# Patient Record
Sex: FEMALE
[De-identification: names, ages, dates, MRNs, and addresses within clinical notes are randomized per-mention and may not be internally consistent; named-entity substitution may affect disease eponyms.]

---

## 2017-07-21 ENCOUNTER — HOSPITAL ENCOUNTER (EMERGENCY)
Dept: HOSPITAL 42 - ED | Age: 62
Discharge: HOME | End: 2017-07-21
Payer: MEDICAID

## 2017-07-21 VITALS — HEART RATE: 75 BPM

## 2017-07-21 VITALS
SYSTOLIC BLOOD PRESSURE: 140 MMHG | OXYGEN SATURATION: 99 % | DIASTOLIC BLOOD PRESSURE: 72 MMHG | RESPIRATION RATE: 16 BRPM

## 2017-07-21 VITALS — TEMPERATURE: 98.3 F

## 2017-07-21 VITALS — BODY MASS INDEX: 25 KG/M2

## 2017-07-21 DIAGNOSIS — E78.00: ICD-10-CM

## 2017-07-21 DIAGNOSIS — R56.9: Primary | ICD-10-CM

## 2017-07-21 LAB
ALBUMIN SERPL-MCNC: 3.8 G/DL (ref 3–4.8)
ALBUMIN/GLOB SERPL: 1.3 {RATIO} (ref 1.1–1.8)
ALT SERPL-CCNC: 16 U/L (ref 7–56)
AST SERPL-CCNC: 20 U/L (ref 15–39)
BASOPHILS # BLD AUTO: 0.03 K/MM3 (ref 0–2)
BASOPHILS NFR BLD: 0.4 % (ref 0–3)
BUN SERPL-MCNC: 12 MG/DL (ref 7–21)
CALCIUM SERPL-MCNC: 9 MG/DL (ref 8.4–10.5)
EOSINOPHIL # BLD: 0.2 10*3/UL (ref 0–0.7)
EOSINOPHIL NFR BLD: 3 % (ref 1.5–5)
ERYTHROCYTE [DISTWIDTH] IN BLOOD BY AUTOMATED COUNT: 12.7 % (ref 11.5–14.5)
GFR NON-AFRICAN AMERICAN: > 60
GRANULOCYTES # BLD: 5.66 10*3/UL (ref 1.4–6.5)
GRANULOCYTES NFR BLD: 74.5 % (ref 50–68)
HGB BLD-MCNC: 13.8 GM/DL (ref 12–16)
LYMPHOCYTES # BLD: 1.2 10*3/UL (ref 1.2–3.4)
LYMPHOCYTES NFR BLD AUTO: 15.4 % (ref 22–35)
MAGNESIUM SERPL-MCNC: 2.1 MG/DL (ref 1.7–2.2)
MCH RBC QN AUTO: 30.9 PG (ref 25–35)
MCHC RBC AUTO-ENTMCNC: 34.2 G/DL (ref 31–37)
MCV RBC AUTO: 90.4 FL (ref 80–105)
MONOCYTES # BLD AUTO: 0.5 10*3/UL (ref 0.1–0.6)
MONOCYTES NFR BLD: 6.7 % (ref 1–6)
PLATELET # BLD: 297 10^3/UL (ref 120–450)
PMV BLD AUTO: 8.8 FL (ref 7–11)
RBC # BLD AUTO: 4.47 10^6/UL (ref 3.5–6.1)
TROPONIN I SERPL-MCNC: < 0.01 NG/ML
WBC # BLD AUTO: 7.6 10^3/UL (ref 4.5–11)

## 2017-07-21 NOTE — CARD
--------------- APPROVED REPORT --------------





EKG Measurement

Heart [id number]

MT 164P18

[id number]

[id number]

[id number]



<Conclusion>

Normal sinus rhythm with sinus arrhythmia

Normal ECG

## 2017-07-21 NOTE — ED PDOC
Arrival/HPI





- General


Historian: Patient, Family





- General


Chief Complaint: Syncope


Time Seen by Provider: 07/21/17 16:04





- History of Present Illness


Narrative History of Present Illness (Text): 





07/21/17 16:55


Pt is a 62 year old female with past medical history significant for seizures 

and high cholesterol who presents with recent seizure activity 30 minutes prior 

to arrival. The patient is joined by her brother in the room for the interview. 

The patient is able to recall walking into a grocery store earlier today with 

feelings of severe headache and lethargy and then waking up surrounded by 

bystanders. Pt states she often feels these symptoms prior to her seizures. The 

patient's brother reports that eye witnesses reported seeing the patient 

collapse and visibly convulse. He is unsure of how long the convulsions lasted 

or whether or not the patient struck her head when she fell. Patient was 

brought to the ED emergency department via EMS. Pt reports her last seizure 

activity was 7/17/2017 and she is compliant with her current epileptic medical 

regiment of Zonisamide and Lacosamide. She denies new medications and illicit 

drug use. She denies headache, change in vision, fever, diarrhea, or shortness 

of breath.   (Reese Lockett)





Past Medical History





- Provider Review


Nursing Documentation Reviewed: Yes





- Infectious Disease


Hx of Infectious Diseases: None





- Tetanus Immunization


Tetanus Immunization: Unknown





- Neurological


Hx Seizures: Yes





- Hematological/Oncological


Hx Cancer: Yes (Skin CA removed from nose)





- Musculoskeletal/Rheumatological


Hx Back Pain: Yes (Sciatic nerve)





- Gastrointestinal


Hx Gall Bladder Disease: Yes


Hx Gastroesophageal Reflux: Yes





- Psychiatric


Hx Depression: No


Hx Emotional Abuse: No


Hx Physical Abuse: No


Hx Substance Use: No





- Surgical History


Hx Cholecystectomy: Yes





- Anesthesia


Hx Anesthesia Reactions: No


Hx Malignant Hyperthermia: No





- Suicidal Assessment


Feels Threatened In Home Enviroment: No





Family/Social History





- Physician Review


Nursing Documentation Reviewed: Yes


Family/Social History: No Known Family HX


Smoking Status: Never Smoked


Hx Alcohol Use: No


Hx Substance Use: No


Hx Substance Use Treatment: No





Allergies/Home Meds


Allergies/Adverse Reactions: 


Allergies





No Known Allergies Allergy (Verified 07/21/17 15:55)


 








Home Medications: 


 Home Meds











 Medication  Instructions  Recorded  Confirmed


 


Unobtainable  07/21/17 07/21/17














Review of Systems





- Physician Review


All systems were reviewed & negative as marked: Yes





- Review of Systems


Constitutional: absent: Fevers


Eyes: absent: Vision Changes


Respiratory: absent: SOB


Cardiovascular: absent: Chest Pain


Neurological: Headache.  absent: Focal Weakness





Physical Exam


Vital Signs Reviewed: Yes


Temperature: Afebrile


Blood Pressure: Normal


Pulse: Regular


Respiratory Rate: Normal


Appearance: Positive for: Well-Appearing


Pain Distress: None


Mental Status: Positive for: Alert and Oriented X 3


Finger Stick Blood Glucose: 130





- Systems Exam


Head: Present: Atraumatic, Normocephalic


Pupils: Present: PERRL


Extroacular Muscles: Present: EOMI


Mouth: Present: Moist Mucous Membranes


Neck: Present: Normal Range of Motion.  No: Meningeal Signs


Respiratory/Chest: Present: Clear to Auscultation, Good Air Exchange.  No: 

Respiratory Distress, Accessory Muscle Use, Wheezes


Cardiovascular: Present: Regular Rate and Rhythm, Normal S1, S2.  No: Murmurs


Abdomen: Present: Normal Bowel Sounds.  No: Tenderness, Distention, Peritoneal 

Signs


Upper Extremity: Present: Normal Inspection.  No: Cyanosis, Edema


Lower Extremity: Present: Normal Inspection.  No: Edema


Neurological: Present: GCS=15, CN II-XII Intact, Speech Normal, Motor Func 

Grossly Intact, Normal Sensory Function


Skin: Present: Warm, Dry, Normal Color.  No: Rashes


Psychiatric: Present: Alert, Oriented x 3, Normal Insight, Normal Concentration





Medical Decision Making





- Lab Interpretations


I have reviewed the lab results: Yes





- RAD Interpretation


: Radiologist





- EKG Interpretation


Interpreted by ED Physician: Yes


Type: 12 lead EKG


ED Course and Treatment: 





07/21/17 17:09


Impression:





Pt is a 62 year old female with past medical history significant for seizures 

who presents to the emergency department after have witnessed seizure activity 

30 minutes prior to arrival. 





Differential Diagnosis included but are not limited to:  





- Primary seizure 


- Syncope





Plan:





- Imaging: EKG, CT w/o contrast of Head


- Labs: CBC, CMP, Mg, Phos, Cardiac enzymes


- Discussed with patient she is not allowed to drive


- Discussed with patient to follow up with neurologist


- Reassess and disposition





Progress Notes:


07/21/17 17:19





07/21/17 21:04


- Pt refuses to stay and wait for primary care provider for further work up 


- Discussed with patient need to follow up with primary care provider and 

neurologist and patient is in understanding


- Advised patient she is to not drive, operate heavy machinery, or swim 





 (Reese Lockett)








07/21/17 18:05


Patient seen and examined with resident.


Came up with treatment and disposition plan with resident.





pt has a hx of seizures, had a witnessed seizure


currently in no distress with no focal neurological deficits on examination





PMD pages, awaiting callback


pt refuses to be admitted, states she wants to be dc'd home


ambulates with steady gait in no distress


instructed to f/u with her PMD and neurologist outpatient and to return to the 

ER for new or worsening symptoms  (Reynold Esposito)





- Lab Interpretations


Lab Results: 








 07/21/17 18:00 





 07/21/17 18:00 





 Lab Results





07/21/17 18:00: Sodium 139, Potassium 4.0, Chloride 106, Carbon Dioxide 24, 

Anion Gap 13, BUN 12, Creatinine 0.9, Est GFR (African Amer) > 60, Est GFR (Non-

Af Amer) > 60, Random Glucose 93, Calcium 9.0, Phosphorus 2.5, Magnesium 2.1, 

Total Bilirubin 0.3, AST 20, ALT 16, Alkaline Phosphatase 124, Lactate 

Dehydrogenase 562, Total Creatine Kinase 49, Troponin I < 0.01, Total Protein 

6.8, Albumin 3.8, Globulin 3.0, Albumin/Globulin Ratio 1.3


07/21/17 18:00: WBC 7.6, RBC 4.47, Hgb 13.8, Hct 40.4, MCV 90.4, MCH 30.9, MCHC 

34.2, RDW 12.7, Plt Count 297, MPV 8.8, Gran % 74.5 H, Lymph % (Auto) 15.4 L, 

Mono % (Auto) 6.7 H, Eos % (Auto) 3.0, Baso % (Auto) 0.4, Gran # 5.66, Lymph # 

1.2, Mono # 0.5, Eos # 0.2, Baso # 0.03


07/21/17 16:11: POC Glucose (mg/dL) 130 H











- RAD Interpretation


Narrative RAD Interpretations (Text): 





07/21/17 19:58


Findings: 


Brain: There are scattered foci of hypodensity within the cerebral white matter

, likely representing small vessel ischemic disease in a patient this age. The 

acuity of the white matter disease is indeterminate. The white-gray 

differentiation is preserved demonstrating no acute territorial type infarct. 

The white-gray differentiation is preserved demonstrating no acute territorial 

type infarct. There is mild prominence of the ventricles and sulci, compatible 

with atrophy. No acute intracranial hemorrhage is seen.   (Reese Lockett)


Radiology Orders: 








07/21/17 16:51


HEAD W/O CONTRAST [CT] Stat 














- EKG Interpretation


EKG Interpretation (Text): 





07/21/17 17:20


Rate of 73 bpm, NSR, No acute ST elevations  (Reese Lockett)





- PA / NP / Resident Statement


MD/DO has reviewed & agrees with the documentation as recorded.


MD/DO has examined the patient and agrees with the treatment plan.





Disposition/Present on Arrival





- Present on Arrival


Any Indicators Present on Arrival: No


History of DVT/PE: No


History of Uncontrolled Diabetes: No


Urinary Catheter: No


History of Decub. Ulcer: No


History Surgical Site Infection Following: None





- Disposition


Have Diagnosis and Disposition been Completed?: Yes


Disposition Time: 21:06


Patient Plan: Discharge





- Disposition


Diagnosis: 


 Seizure





Disposition: HOME/ ROUTINE


Condition: GOOD


Additional Instructions: 





Mrs. Mortensen, thank you for letting us take care of you today. Your provider was 

Dr. Lockett. You were treated for seizure. The emergency medical care you 

received today was directed at your acute symptoms. If you were prescribed any 

medication, please fill it and take as directed. It may take several days for 

your symptoms to resolve. Return to the Emergency Department if your symptoms 

worsen, do not improve, or if you have any other problems.





Please contact your doctor or call one of the physicians/clinics you have been 

referred to that are listed on the Patient Visit Information form that is 

included in your discharge packet. Bring any paperwork you were given at 

discharge with you along with any medications you are taking to your follow up 

visit. Our treatment cannot replace ongoing medical care by a primary care 

provider (PCP) outside of the emergency department.





Thank you for allowing the [facility] team to be part of your care today.





FOLLOW UP WITH PRIMARY CARE PROVIDER





FOLLOW UP WITH NEUROLOGIST


Referrals: 


Radha Abreu MD [Primary Care Provider] - Follow up with primary

## 2017-07-21 NOTE — CT
EXAM:

  CT Head Without Intravenous Contrast



CLINICAL HISTORY:

  The patient age is 62 years old and is female; Pain; Headache; Headache not 

specified; Additional info: Seizure activity Facility exam id and description: 

Ct heads head w/o contrast



TECHNIQUE:

  Axial computed tomography images of the head/brain without intravenous 

contrast.  This CT exam was performed using one or more of the following dose 

reduction techniques:  automated exposure control, adjustment of the mA and/or 

kV according to patient size, and/or use of iterative reconstruction technique.



EXAM DATE/TIME:

  7/21/2017 4:51 PM



COMPARISON:

  No relevant prior studies available.



FINDINGS:

  Brain:  There are scattered foci of hypodensity within the cerebral white 

matter, likely representing small vessel ischemic disease in a patient this 

age.  The acuity of the white matter disease is indeterminate.  The white-gray 

differentiation is preserved demonstrating no acute territorial type infarct.  

There is mild prominence of the ventricles and sulci, compatible with atrophy.  

No acute intracranial hemorrhage is seen.

  Midline shift:  There is no midline shift.

  Ventricles:  See above.

  Bones/joints:  The calvarium demonstrates no evidence for a depressed 

fracture.

  Soft tissues:  No acute abnormality.

  Vasculature:  There is mild atherosclerotic calcification of the cavernous 

internal carotid arteries.

  Sinuses:  Unremarkable as visualized.  No acute sinusitis.

  Mastoid air cells:  No mastoid effusion.



IMPRESSION:     

1.  No acute intracranial hemorrhage or acute territorial type infarct.

2.  There are scattered foci of hypodensity within the cerebral white matter, 

likely representing small vessel ischemic disease in a patient this age.  

3.  Mild atrophy.

## 2018-03-13 ENCOUNTER — HOSPITAL ENCOUNTER (INPATIENT)
Dept: HOSPITAL 42 - ED | Age: 63
LOS: 3 days | Discharge: HOME | DRG: 138 | End: 2018-03-16
Attending: HOSPITALIST
Payer: MEDICAID

## 2018-03-13 VITALS — BODY MASS INDEX: 25.7 KG/M2

## 2018-03-13 DIAGNOSIS — K20.9: ICD-10-CM

## 2018-03-13 DIAGNOSIS — K44.9: ICD-10-CM

## 2018-03-13 DIAGNOSIS — G40.919: ICD-10-CM

## 2018-03-13 DIAGNOSIS — I47.1: ICD-10-CM

## 2018-03-13 DIAGNOSIS — I08.3: ICD-10-CM

## 2018-03-13 DIAGNOSIS — K63.89: ICD-10-CM

## 2018-03-13 DIAGNOSIS — M79.661: ICD-10-CM

## 2018-03-13 DIAGNOSIS — G43.909: ICD-10-CM

## 2018-03-13 DIAGNOSIS — M54.16: ICD-10-CM

## 2018-03-13 DIAGNOSIS — M54.30: ICD-10-CM

## 2018-03-13 DIAGNOSIS — K64.8: ICD-10-CM

## 2018-03-13 DIAGNOSIS — I48.0: Primary | ICD-10-CM

## 2018-03-13 DIAGNOSIS — K29.70: ICD-10-CM

## 2018-03-13 DIAGNOSIS — F41.0: ICD-10-CM

## 2018-03-13 DIAGNOSIS — K21.9: ICD-10-CM

## 2018-03-13 LAB
ALBUMIN SERPL-MCNC: 4.3 G/DL (ref 3–4.8)
ALBUMIN/GLOB SERPL: 1.3 {RATIO} (ref 1.1–1.8)
ALT SERPL-CCNC: 31 U/L (ref 7–56)
AST SERPL-CCNC: 32 U/L (ref 14–36)
BASOPHILS # BLD AUTO: 0.03 K/MM3 (ref 0–2)
BASOPHILS NFR BLD: 0.4 % (ref 0–3)
BUN SERPL-MCNC: 15 MG/DL (ref 7–21)
CALCIUM SERPL-MCNC: 10 MG/DL (ref 8.4–10.5)
EOSINOPHIL # BLD: 0.3 10*3/UL (ref 0–0.7)
EOSINOPHIL NFR BLD: 3.6 % (ref 1.5–5)
ERYTHROCYTE [DISTWIDTH] IN BLOOD BY AUTOMATED COUNT: 13 % (ref 11.5–14.5)
GFR NON-AFRICAN AMERICAN: 56
GRANULOCYTES # BLD: 4.25 10*3/UL (ref 1.4–6.5)
GRANULOCYTES NFR BLD: 61.5 % (ref 50–68)
HGB BLD-MCNC: 15.5 G/DL (ref 12–16)
LYMPHOCYTES # BLD: 2 10*3/UL (ref 1.2–3.4)
LYMPHOCYTES NFR BLD AUTO: 28.7 % (ref 22–35)
MCH RBC QN AUTO: 30.3 PG (ref 25–35)
MCHC RBC AUTO-ENTMCNC: 33.5 G/DL (ref 31–37)
MCV RBC AUTO: 90.4 FL (ref 80–105)
MONOCYTES # BLD AUTO: 0.4 10*3/UL (ref 0.1–0.6)
MONOCYTES NFR BLD: 5.8 % (ref 1–6)
PLATELET # BLD: 299 10^3/UL (ref 120–450)
PMV BLD AUTO: 9.6 FL (ref 7–11)
RBC # BLD AUTO: 5.12 10^6/UL (ref 3.5–6.1)
TROPONIN I SERPL-MCNC: < 0.01 NG/ML
WBC # BLD AUTO: 6.9 10^3/UL (ref 4.5–11)

## 2018-03-13 NOTE — CARD
--------------- APPROVED REPORT --------------





EKG Measurement

Heart [id number]

IN 216P57

[id number]

[id number]

[id number]



<Conclusion>

Sinus bradycardia with sinus arrhythmia with 1st degree AV block

Nonspecific T wave abnormality

LAD

## 2018-03-13 NOTE — US
HISTORY:

Leg pain and swelling. Evaluate for DVT



PHYSICIAN(S):  Abelardo Trinidad MD.



TECHNIQUE:

Duplex sonography and color-flow Doppler with graded compression were 

used to evaluate the deep venous systems of both lower extremities. 



FINDINGS:

The visualized deep venous systems of both lower extremities are 

sonographically normal and compressible. Normal wave forms and 

augmentation are seen. There is no sonographic evidence for deep 

venous thrombosis in the visualized segments of both lower 

extremities.



IMPRESSION:

No sonographic evidence for deep venous thrombosis in the visualized 

segments of both lower extremities.

## 2018-03-13 NOTE — RAD
HISTORY:

A-Fib- hx of Chest pain  



COMPARISON:

08/18/2015 



FINDINGS:



LUNGS:

No active pulmonary disease.



PLEURA:

No significant pleural effusion identified, no pneumothorax apparent.



CARDIOVASCULAR:

Normal.



OSSEOUS STRUCTURES:

No significant abnormalities.



VISUALIZED UPPER ABDOMEN:

Normal.



OTHER FINDINGS:

None.



IMPRESSION:

No active disease.

## 2018-03-13 NOTE — CON
DATE:  2018



SERVICE:  Cardiology.



REASON FOR THE CONSULTATION:  AFib, new onset.



BRIEF CLINICAL HISTORY:  This is a 62-year-old female who came in for

endoscopy today, but while the patient being hooked up, he was found to be

in AFib, so the patient was sent to the ER.  Patient denies any chest pain,

shortness of breath, denies any palpitation.  Cardiology consult was called

for evaluation and further management.  Patient is currently in ER, bed 6.



PAST MEDICAL HISTORY:  Significant for gastroesophageal reflux, seizure

disorder, status post cardiac catheterization by Dr. Win at [facility] in , found to be normal.



PAST SURGICAL HISTORY:  Significant for neck surgery,  section,

cholecystectomy, appendectomy in the past also admission cardiac

catheterization 3 years ago by Dr. Win at [facility],

where usually patient goes periodically and _____ heart is good.



FAMILY HISTORY:  Father had a throat cancer.  Mother had heart disease and

renal disease.



SOCIAL HISTORY:  Denies smoking.  Denies any history of alcohol abuse.  No

history of illicit drug abuse.  Lives with the .



CURRENT MEDICATIONS:  Patient is taking at home Zonegran 200 mg p.o.,

Topamax for seizure, Protonix and multivitamin.



REVIEW OF SYSTEMS:  As per HPI.



PHYSICAL EXAMINATION:  As follows:

VITAL SIGNS:  Temperature afebrile, heart rate 86, blood pressure 105/51.

HEENT:  PERRLA.  Intact.

NECK:  Supple.  No carotid bruit or thyromegaly.

CHEST:  Clear to auscultation.

HEART:  S1, S2, regular.

ABDOMEN:  Soft.

EXTREMITIES:  Clubbing and cyanosis negative.



LABORATORY DATA:  Blood workup:  WBC 6.9, hemoglobin 15.5, hematocrit 46.3,

platelet count 299.  Chemistry shows sodium 144, potassium 4.5, chloride

110, carbon dioxide 22, anion gap of 15, BUN 16, creatinine 1.  Troponin

0.01 negative.



Previous cardiac workup as follows:  Patient had a cardiac catheterization

3 years ago at [facility] by Dr. Win as told, essentially

normal coronary.  Patient in the most recent echo, here at [facility] that was

2015, almost 2-1/2 years ago that showed ejection fraction 55%,

mild-to-moderate aortic regurgitation, trace mitral regurgitation,

trace-to-mild tricuspid regurgitation, RV systolic pressure 29, trivial PE

dated 2015.  EKG shows initial admission, AFib, rate of 125,

nonspecific ST-T changes.  Patient was given 10 mg of Cardizem and patient

converted to normal sinus.  Post spontaneous cardioversion after Cardizem

shows normal sinus, sinus merced at rate of 56, poor RR progression, left

axis deviation.  No acute ST-T changes noted.



IMPRESSION:  Paroxysmal atrial fibrillation, history of seizure disorder,

history of cardiac catheterization 3 years ago at [facility] that was negative, previous echo 3 years ago normal, mild mitral

regurgitation, mild tricuspid regurgitation, mild aortic regurgitation, who

came in for gastroesophageal reflux and gastrointestinal workup, found to

be in atrial fibrillation, converted to normal sinus after 10 mg of IV

Cardizem.



RECOMMENDATIONS:  We will admit, give a dose of Lovenox.  Though patient

has a low JEANNE score, long-term anticoagulation, probably patient may not

need.  We will start Cardizem 30 mg q.8 hours.  We will get echo to assess

LV function, lipid profile, TSH, hemoglobin A1c and follow up EKG and echo.



Thank you, Dr. Hurley, for providing us the opportunity in taking care of

the patient, Serina Mortensen.  Further recommendations will be made depending

on the hospital course.





__________________________________________

Yolis Smiley MD





DD:  2018 12:50:10

DT:  2018 14:16:50

Job # [id number]

## 2018-03-13 NOTE — ED PDOC
Arrival/HPI





- General


Historian: Patient





- History of Present Illness


Time/Duration: Prior to Arrival, > week


Symptom Onset: Sudden


Symptom Course: Resolved


Quality: Tightness


Severity Level: 1


Activities at Onset: Rest


Context: Sitting





<Mario Serrano - Last Filed: 03/13/18 13:59>





<Yvon Wilson - Last Filed: 03/13/18 18:16>





- General


Chief Complaint: Palpitations


Time Seen by Provider: 03/13/18 09:15





- History of Present Illness


Narrative History of Present Illness (Text): 





03/13/18 09:24


62F w/ hx of seizures presents to ED w/ elevated heart rate in 140s from same 

day surgery. Patient was initially scheduled to have a colonoscopy and EGD for 

heart burn for many years. In the ED patient denies current symptoms of chest 

pain, heart palpitations, nausea, vomiting diarrhea. One week ago, patient had 

left sided chest pain described as chest tightness radiated to the left arm. 

Pain subsided after 2-3 days. Admits to being non-compliant with taking 

Aspirin. 


Of note: sees cardiologist and is scheduled to have a stress test done in April of 2018. 





PMH: Seziures


ALL: NKDA


SocialHx: denies Tobacco, etoh, recreational drug use. Live at home with 

, who she takes care of full time. States she needs to get back home to care 

for her .  stays active and walks 15 blocks per day





03/13/18 09:30


 (MerchantMario)





Past Medical History





- Provider Review


Nursing Documentation Reviewed: Yes





- Travel History


Have you recently traveled outside [place] w/in the past 3 mons?: No





- Past History


Past History: Non-Contributing





- Infectious Disease


Hx of Infectious Diseases: None





- Tetanus Immunization


Tetanus Immunization: Unknown





- Cardiac


Hx Heart Murmur: Yes





- Pulmonary


Hx Respiratory Disorders: No





- Neurological


Hx Seizures: Yes





- HEENT


Hx HEENT Disorder: No





- Renal


Hx Renal Disorder: No





- Endocrine/Metabolic


Hx Endocrine Disorders: No





- Hematological/Oncological


Hx Blood Disorders: No





- Integumentary


Hx Dermatological Disorder: No





- Musculoskeletal/Rheumatological


Hx Musculoskeletal Disorders: No





- Gastrointestinal


Hx Gall Bladder Disease: Yes


Hx Gastroesophageal Reflux: Yes





- Genitourinary/Gynecological


Hx Genitourinary Disorders: No





- Psychiatric


Hx Panic Disorder: Yes


Hx Substance Use: No





- Surgical History


Hx Cholecystectomy: Yes





- Anesthesia


Hx Anesthesia Reactions: No


Hx Malignant Hyperthermia: No





- Suicidal Assessment


Feels Threatened In Home Enviroment: No





<MilagrosMario - Last Filed: 03/13/18 13:59>





Family/Social History





- Physician Review


Nursing Documentation Reviewed: Yes


Family/Social History: Other (non-contributory)


Smoking Status: Never Smoked


Hx Alcohol Use: No


Hx Substance Use: No


Hx Substance Use Treatment: No





<MerchantMario - Last Filed: 03/13/18 13:59>





Allergies/Home Meds





<YeseniaadMario - Last Filed: 03/13/18 13:59>





<Yvon Wilson - Last Filed: 03/13/18 18:16>


Allergies/Adverse Reactions: 


Allergies





No Known Allergies Allergy (Verified 03/13/18 11:58)


 








Home Medications: 


 Home Meds











 Medication  Instructions  Recorded  Confirmed


 


Topiramate [Topamax] 50 mg PO BID 03/08/18 03/13/18


 


Zonisamide [Zonegran] 200 mg PO AMHS 03/08/18 03/13/18


 


Clobazam [Onfi] 10 mg PO BID 03/13/18 03/13/18


 


Lacosamide [Vimpat] 100 mg PO HS 03/13/18 03/13/18


 


Multivitamin [Daily Thanh] 1 tab PO DAILY 03/13/18 03/13/18


 


Pantoprazole Sodium [Protonix] 40 mg PO DAILY 03/13/18 03/13/18














Review of Systems





- Physician Review


All systems were reviewed & negative as marked: Yes





- Review of Systems


Constitutional: absent: Fatigue, Weight Change, Fevers


Eyes: absent: Vision Changes


ENT: absent: Hearing Changes, Tinnitus, Epistaxis


Respiratory: absent: SOB, Cough, Sputum


Cardiovascular: absent: Chest Pain, Palpitations, Edema, Calf Pain


Gastrointestinal: absent: Abdominal Pain, Stool Changes, Constipation, Nausea, 

Vomiting


Musculoskeletal: absent: Arthralgias, Back Pain


Skin: absent: Rash, Pruritis


Neurological: absent: Headache, Dizziness, Focal Weakness


Endocrine: absent: Diaphoresis


Hemo/Lymphatic: absent: Adenopathy


Psychiatric: absent: Anxiety





<MerchantMario - Last Filed: 03/13/18 13:59>





Physical Exam


Vital Signs Reviewed: Yes


Temperature: Afebrile


Blood Pressure: Normal


Pulse: Tachycardic


Respiratory Rate: Normal


Appearance: Positive for: Well-Appearing, Non-Toxic, Comfortable


Pain Distress: None


Mental Status: Positive for: Alert and Oriented X 3





- Systems Exam


Head: Present: Atraumatic, Normocephalic


Pupils: Present: PERRL


Extroacular Muscles: Present: EOMI


Conjunctiva: Present: Normal


Mouth: Present: Moist Mucous Membranes


Neck: Present: Normal Range of Motion.  No: JVD, Lymphadenopathy, Bruit


Respiratory/Chest: Present: Clear to Auscultation, Good Air Exchange.  No: 

Respiratory Distress, Accessory Muscle Use


Cardiovascular: Present: Normal S1, S2, Tachycardic.  No: Rub, Gallop


Abdomen: No: Tenderness, Distention, Normal Bowel Sounds


Upper Extremity: Present: Normal Inspection, NORMAL PULSES.  No: Edema


Lower Extremity: Present: Normal Inspection.  No: Edema, CALF TENDERNESS


Neurological: Present: GCS=15, Speech Normal


Skin: Present: Warm, Normal Color


Psychiatric: Present: Alert, Oriented x 3





<Mario Serrano - Last Filed: 03/13/18 13:59>


Vital Signs











  Temp Pulse Pulse Resp BP Pulse Ox


 


 03/13/18 16:00   62   18  145/61  98


 


 03/13/18 14:17   68    118/66 


 


 03/13/18 14:00   62   18  118/66  97


 


 03/13/18 11:46   62   16  105/71  97


 


 03/13/18 10:19  97.8 F     


 


 03/13/18 09:21   144 H    142/79 


 


 03/13/18 09:13   144 H   18  142/79  100


 


 03/13/18 09:04    55 L   














Medical Decision Making





- Lab Interpretations


I have reviewed the lab results: Yes


Interpretation: All labs normal





- EKG Interpretation


Interpreted by ED Physician: Yes


Type: 12 lead EKG


Comparison: Different from prev. EKG





<Mario Serrano - Last Filed: 03/13/18 13:59>





<Yvon Wilson - Last Filed: 03/13/18 18:16>


ED Course and Treatment: 





03/13/18 09:32


Aspirin


Cardizem for tachycardia- Rate control


Cardiac enzymes


CBC/CMP/Mag/Phos


CXR/EKG





03/13/18 11:53


Discussed case w/ Dr. Smiley in detail. Will start on 30 Cardizem PO q6.  (

Mario Serrano)


A 62 year old female sent in for atrial fibrillation. In agreement with 

resident note, which includes further HPI details. Patient was seen and 

evaluated with resident, came up with plan and treatment together. Patient with 

new onset a-fib, rule out ACS.





03/13/18 08:14


EKG prior to arrival showed atrial fibrillation at 125 BPM. Interpreted by me.





Cardizem 10 ml IV given for heart rate in the 130's which corrected rate to 70





03/13/18 09:00


EKG shows sinus bradycardia at 56 BPM with 1st degree AV block. Interpreted by 

me.





03/13/18 11:14


Case discussed with Dr. Ha who will accept the patient to her service. Dr. Smiley was consulted and recommended Cardizem 30mg PO q6h. He will order the 

Lovenox if needed. Aspirin 81mg is good for now. He will order an echo. Patient 

admitted to Telemetry for New Onset Afib.  (Yvon Wilson)





- Lab Interpretations


Narrative Lab Interpretation (Text): 





03/13/18 11:54


Reviewed all labs. WNL. Trops negative (Mario)


Lab Results: 








 03/13/18 09:30 





 03/13/18 09:30 





 Lab Results





03/13/18 09:30: Sodium 144, Potassium 4.5, Chloride 110 H, Carbon Dioxide 22, 

Anion Gap 16, BUN 15, Creatinine 1.0, Est GFR (African Amer) > 60, Est GFR (Non-

Af Amer) 56, Random Glucose 93, Calcium 10.0, Phosphorus 2.5, Magnesium 2.2, 

Total Bilirubin 0.6, AST 32, ALT 31, Alkaline Phosphatase 111, Lactate 

Dehydrogenase 637, Total Creatine Kinase 146, Troponin I < 0.01, Total Protein 

7.6, Albumin 4.3, Globulin 3.3, Albumin/Globulin Ratio 1.3


03/13/18 09:30: WBC 6.9, RBC 5.12, Hgb 15.5, Hct 46.3, MCV 90.4, MCH 30.3, MCHC 

33.5, RDW 13.0, Plt Count 299, MPV 9.6, Gran % 61.5, Lymph % (Auto) 28.7, Mono 

% (Auto) 5.8, Eos % (Auto) 3.6, Baso % (Auto) 0.4, Gran # 4.25, Lymph # (Auto) 

2.0, Mono # (Auto) 0.4, Eos # (Auto) 0.3, Baso # (Auto) 0.03











- RAD Interpretation


Radiology Orders: 








03/13/18 10:09


CXR [CHEST PORTABLE] [RAD] Stat 














- EKG Interpretation


EKG Interpretation (Text): 





03/13/18 09:34


Sinus merced w/ 1st Degree AV block


 (Merchant,Mario)





- Medication Orders


Current Medication Orders: 








Acetaminophen (Tylenol 325mg Tab)  650 mg PO Q6H PRN


   PRN Reason: Fever >100.4 F


Diltiazem HCl (Cardizem)  30 mg PO Q8 HARSHIL


   Last Admin: 03/13/18 14:17  Dose: 30 mg





MAR Pulse and Blood Pressure


 [place]     03[place]13/18 14:17  [facility]  [place]: 03/13/18 14:17  [facility]  [id number]


     Pulse


      Pulse Rate (60-90)                         68


     Blood Pressure


      Blood Pressure (100//90)             118/66





Lorazepam (Ativan)  1 mg IVP Q2H PRN; Protocol


   PRN Reason: Seizure activity


Multivitamins (Thera Tab)  1 tab PO DAILY HARSHIL


Non-Formulary Medication (Clobazam [Onfi])  10 mg PO BID HARSHIL


Non-Formulary Medication (Lacosamide [Vimpat])  100 mg PO HS HARSHIL


Pantoprazole Sodium (Protonix Ec Tab)  40 mg PO DAILY HARSHIL


Topiramate (Topamax)  50 mg PO BID HARSHIL


   PRN Reason: Protocol


Zonisamide (Zonegran)  200 mg PO AMHS HARSHIL





Discontinued Medications





Aspirin (Aspirin Chewable)  81 mg PO STAT STA


   Stop: 03/13/18 09:26


   Last Admin: 03/13/18 09:53  Dose: 81 mg





Diltiazem HCl (Cardizem)  20 mg IVP STAT STA


   Stop: 03/13/18 09:16


   Last Admin: 03/13/18 09:21  Dose: 20 mg





IVP Administration


 Document     03/13/18 09:21  [facility]  [place]Rec: 03/13/18 09:22  [facility]  [id number][place]


     Charges for Administration


      # of IVP Administrations                   1


MAR Pulse and Blood Pressure


 Document     03/13/18 09:21  [facility]  (Rec: 03/13/18 09:22  [facility]  [id number])


     Pulse


      Pulse Rate (60-90)                         144


     Blood Pressure


      Blood Pressure (100//90)             142/79





Non-Formulary Medication (Multivitamin [Daily Thanh])  1 tab PO DAILY HARSHIL











- PA / NP / Resident Statement


MD/DO has reviewed & agrees with the documentation as recorded.


MD/DO has examined the patient and agrees with the treatment plan.





<Mario Serrano - Last Filed: 03/13/18 13:59>





Disposition/Present on Arrival





- Present on Arrival


Any Indicators Present on Arrival: No


History of DVT/PE: No


History of Uncontrolled Diabetes: No


Urinary Catheter: No


History of Decub. Ulcer: No


History Surgical Site Infection Following: None





- Disposition


Have Diagnosis and Disposition been Completed?: Yes


Disposition Time: 12:26


Patient Plan: Admission





<Mario Serrano - Last Filed: 03/13/18 13:59>





- Disposition


Disposition Time: 11:14





<Yvon Wilson - Last Filed: 03/13/18 18:16>





- Disposition


Diagnosis: 


 Atrial fibrillation with RVR





Disposition: HOSPITALIZED


Condition: STABLE

## 2018-03-13 NOTE — CP.PCM.HP
<Naomie Trevizo - Last Filed: 18 14:08>





History of Present Illness





- History of Present Illness


History of Present Illness: 





CC: I was sent here from Endo suite





HPI: 62F with PMHx of seizure disorder presented to emergency department from 

the endoscopy suite for Afib with RVR. Patient states she was scheduled for a 

colonoscopy and EGD today when she was found to be in atrial fibrillation with 

RVR on the monitor. She denied any acute complaints of chest pain, palpitations

, lightheadedness, SOB at the time. Patient does not have a history of Afib or 

any cardiac history. She reports she had a high temp of 102F the day earlier 

with associated chills and diaphoresis for which she took ASA and it resolved. 

Patient also reports a single bloody BM that occurred 2 days ago. Stool was 

loose with bright red blood and maroon stool. Her last colonoscopy was 2 years 

ago and she has a hx of colon polyps and hemorrhoids as well as severe 

symptomatic GERD. No personal hx of colon cancer or esophageal cancer. Patient 

also disclosed a history of frequent seizures and takes several seizure 

medications. She was enrolled in a blinded drug trial where she took an 

unlabled seizure medication for 6 weeks. She stopped this medication 3 weeks 

ago because it did not give her any symptomatic relief. She has on average 2 

seizures per week. Her seizures occur during sleep and are usually witnessed by 

her . She takes 325 mg of aspirin following seizures and states she is 

compliant with her seizure medications. Patient denied recent travel or 

immobilization. No sick contacts. She has been eating and drinking normally. 

With the exception of the above and her recent bowel prep before colonoscopy, 

she has normal daily bowel movements. She got a flu shot this year.





ROS: Denies any chest pain, dyspnea, palpitations, headache, vision changes, 

abdominal pain, nausea, vomiting, new swelling, weight loss, fatigue.


 


PMHx: seizures (dx at age 19), GERD, migraine headaches, prior hx of panic 

attacks, heart murmur when she was a child 


PSurgHx:  Neck surgery (unclear which kind), , cholecystectomy, 

appendectomy


Meds: pls see chart 


ALL: NKDA


SocHx: denies Tobacco, etoh, recreational drug use. 2 liters of coca cola per 

day (no other caffeine intake). Live at home with  (86 yo), who she 

takes care of full time. States she needs to get back home to care for her 

.  stays active and walks 15 blocks per day


FamHx: Father with throat CA. Mother with heart disease,  from chronic 

kidney disease. Brothers, sisters, nephews all have heart disease (unspecified)

. 





PMD: Dr. Abreu - last seen 1-2weeks ago


GI: Dr. Swain


Cardiology: None


Neurologist: Dr. Avila





Present on Admission





- Present on Admission


Any Indicators Present on Admission: No





Review of Systems





- Constitutional


Constitutional: As Per HPI, Chills, Fever.  absent: Weight Loss, Weakness





- EENT


Eyes: As Per HPI.  absent: Blurred Vision


Ears: As Per HPI.  absent: Disequilibrium, Dizziness


Nose/Mouth/Throat: As Per HPI.  absent: Sore Throat





- Cardiovascular


Cardiovascular: As Per HPI, Irregular Heart Rhythm.  absent: Chest Pain, Dyspnea

, Dyspnea on Exertion, Edema, Pain Radiating to Arm/Neck/Jaw, Leg Edema, 

Palpitations





- Respiratory


Respiratory: As Per HPI.  absent: Cough, Dyspnea, Chest Congestion





- Gastrointestinal


Gastrointestinal: As Per HPI, Heartburn, Hematochezia, Loose Stools.  absent: 

Abdominal Pain, Constipation, Diarrhea, Hematemesis, Nausea, Vomiting





- Genitourinary


Genitourinary: As Per HPI.  absent: Dysuria, Hematuria, Pyuria





- Musculoskeletal


Musculoskeletal: As Per HPI.  absent: Back Pain, Numbness, Tingling





- Integumentary


Integumentary: As Per HPI.  absent: Dry Skin, Rash





- Neurological


Neurological: As Per HPI.  absent: Dizziness, Numbness, Headaches, Tingling





- Psychiatric


Psychiatric: As Per HPI.  absent: Anxiety, Depression





- Endocrine


Endocrine: As Per HPI.  absent: Polydipsia, Polyphagia, Polyuria





- Hematologic/Lymphatic


Hematologic: As Per HPI.  absent: Easy Bleeding, Easy Bruising, Lymphadenopathy





Past Patient History





- Infectious Disease


Hx of Infectious Diseases: None





- Tetanus Immunizations


Tetanus Immunization: Unknown





- Past Social History


Smoking Status: Never Smoked





- CARDIAC


Hx Heart Murmur: Yes





- PULMONARY


Hx Respiratory Disorders: No





- NEUROLOGICAL


Hx Seizures: Yes





- HEENT


Hx HEENT Problems: No





- RENAL


Hx Chronic Kidney Disease: No





- ENDOCRINE/METABOLIC


Hx Endocrine Disorders: No





- HEMATOLOGICAL/ONCOLOGICAL


Hx Blood Disorders: No





- INTEGUMENTARY


Hx Dermatological Problems: No





- MUSCULOSKELETAL/RHEUMATOLOGICAL


Hx Musculoskeletal Disorders: No





- GASTROINTESTINAL


Hx Gall Bladder Disease: Yes


Hx Gastroesophageal Reflux: Yes





- GENITOURINARY/GYNECOLOGICAL


Hx Genitourinary Disorders: No





- PSYCHIATRIC


Hx Panic Symptoms: Yes


Hx Substance Use: No





- SURGICAL HISTORY


Hx Cholecystectomy: Yes





- ANESTHESIA


Hx Anesthesia Reactions: No


Hx Malignant Hyperthermia: No





Meds


Allergies/Adverse Reactions: 


 Allergies











Allergy/AdvReac Type Severity Reaction Status Date / Time


 


No Known Allergies Allergy   Verified 18 11:58














Physical Exam





- Constitutional


Appears: Well, No Acute Distress





- Head Exam


Head Exam: ATRAUMATIC





- Eye Exam


Eye Exam: EOMI, Normal appearance, PERRL.  absent: Conjunctival injection, 

Scleral icterus


Pupil Exam: NORMAL ACCOMODATION


Additional comments: 





No proptosis.





- ENT Exam


ENT Exam: Mucous Membranes Moist





- Neck Exam


Neck exam: Positive for: Full Rom, Normal Inspection





- Respiratory Exam


Respiratory Exam: Chest Wall Tenderness, Clear to Auscultation Bilateral, 

NORMAL BREATHING PATTERN.  absent: Accessory Muscle Use, Rhonchi, Wheezes, 

Respiratory Distress


Additional comments: 





Tenderness over L anterior axillary fold. Closely associated with linear 4 cm 

scar from surgical incision, s/p implant removal.





- Cardiovascular Exam


Cardiovascular Exam: Tachycardia, Irregular Rhythm, +S1, +S2.  absent: Gallop, 

Rubs, Systolic Murmur





- GI/Abdominal Exam


GI & Abdominal Exam: Normal Bowel Sounds, Soft.  absent: Distended, Firm, 

Guarding, Rigid, Tenderness





- Rectal Exam


Rectal Exam: Deferred





- Extremities Exam


Extremities exam: Positive for: calf tenderness


Additional comments: 





R calf tenderness to palpation. No L calf swelling or tenderness. 2+ DP pulses 

bilaterally. No pretibial edema.





- Back Exam


Back exam: NORMAL INSPECTION.  absent: rash noted





- Neurological Exam


Neurological exam: Alert, CN II-XII Intact, Oriented x3





- Psychiatric Exam


Psychiatric exam: Normal Affect, Normal Mood





- Skin


Skin Exam: Dry, Intact, Normal Color, Warm





Results





- Vital Signs


Recent Vital Signs: 





 Last Vital Signs











Temp  97.8 F   18 10:19


 


Pulse  61   18 11:46


 


Resp  16   18 11:46


 


BP  105/71   18 11:46


 


Pulse Ox  97   18 11:46














- Labs


Result Diagrams: 


 18 09:30





 18 09:30





Assessment & Plan





- Assessment and Plan (Free Text)


Assessment: 





61yo female PMHx seizure disorder presents with new-onset Afib with RVR.





Plan: 





1. Chest pain r/o ACS


- Troponin q6h


   First troponin negative


- f/u A1c, fasting lipid panel, hsCRP


- hold ASA for GI bleed


- f/u Echo


- Heart Healthy Diet


- Cardio Dr. Smiley on board





2. New-onset Afib with RVR.


- [id number] score 1


- HASBLED score 2


- Cardizem 30mg po q8h as per cardio reccs


- no anticoagulation in light of GI bleed


- f/u TSH, free T4


- f/u Echo


- Cardio Dr. Smiley on board





3. GI bleed


- patient has hx of GERD and polyps and hemorrhoids in the past


- no kaushal blood noted and H&H is stable on admission


- Daily H&H


- Hold anticoagulation due to risk of worsening bleed


- Consider GI consult for inpatient colonoscopy/EGD when medically optimized





4. R calf pain


- Well's Score for DVT: 1


- Bilateral LE Doppler US


- Hold SCDs





5. Hx of fever


- Resolved on admission


- CXR: NAD


- f/u procalcitonin


- f/u blood cultures x2, UA, urine culture, rapid flu


- Tylenol prn for fever





6. Hx of seizure disorder


- Restarted on home meds


   Clobazam 10mg po bid


   Vimpat 100mg po hs


   Zonisamide 200mg po amhs


- Ativan 1mg q2h prn seizures


- Seizure and fall precautions


- 60g protein in diet





GI ppx: Protonix 40mg po qd


DVT ppx: c/i secondary to bleed; SCDs if dopplers negative for DVT


Diet: HHD with 60g protein


Precautions: Fall/Seizure





Discussed with Dr. Ayleen Trevizo PGY2








<Brandi Abbasi - Last Filed: 18 08:57>





Results





- Vital Signs


Recent Vital Signs: 





 Last Vital Signs











Temp  97.6 F   18 06:00


 


Pulse  63   18 06:00


 


Resp  21   18 06:00


 


BP  136/52 L  18 06:00


 


Pulse Ox  98   18 06:00














- Labs


Result Diagrams: 


 18 06:45





 18 06:45


Labs: 





 Laboratory Results - last 24 hr











  18





  13:43 15:51 20:54


 


WBC   


 


RBC   


 


Hgb   


 


Hct   


 


MCV   


 


MCH   


 


MCHC   


 


RDW   


 


Plt Count   


 


MPV   


 


Gran %   


 


Lymph % (Auto)   


 


Mono % (Auto)   


 


Eos % (Auto)   


 


Baso % (Auto)   


 


Gran #   


 


Lymph # (Auto)   


 


Mono # (Auto)   


 


Eos # (Auto)   


 


Baso # (Auto)   


 


Sodium   


 


Potassium   


 


Chloride   


 


Carbon Dioxide   


 


Anion Gap   


 


BUN   


 


Creatinine   


 


Est GFR ( Amer)   


 


Est GFR (Non-Af Amer)   


 


Random Glucose   


 


Calcium   


 


Phosphorus   


 


Magnesium   


 


Total Bilirubin   


 


AST   


 


ALT   


 


Alkaline Phosphatase   


 


Troponin I   < 0.01  < 0.01


 


Total Protein   


 


Albumin   


 


Globulin   


 


Albumin/Globulin Ratio   


 


Triglycerides   


 


Cholesterol   


 


LDL Cholesterol Direct   


 


HDL Cholesterol   


 


Thyroxine (T4)   


 


TSH 3rd Generation   


 


Influenza Typ A,B (EIA)  Negative for flu a/b  














  18





  06:45 06:45 06:45


 


WBC  8.6  D  


 


RBC  4.68  


 


Hgb  14.0  


 


Hct  42.3  


 


MCV  90.4  


 


MCH  29.9  


 


MCHC  33.1  


 


RDW  13.0  


 


Plt Count  325  


 


MPV  9.7  


 


Gran %  67.3  


 


Lymph % (Auto)  23.4  


 


Mono % (Auto)  6.6 H  


 


Eos % (Auto)  2.4  


 


Baso % (Auto)  0.3  


 


Gran #  5.79  


 


Lymph # (Auto)  2.0  


 


Mono # (Auto)  0.6  


 


Eos # (Auto)  0.2  


 


Baso # (Auto)  0.03  


 


Sodium   143 


 


Potassium   4.2 


 


Chloride   112 H 


 


Carbon Dioxide   20 L 


 


Anion Gap   16 


 


BUN   18 


 


Creatinine   0.9 


 


Est GFR ( Amer)   > 60 


 


Est GFR (Non-Af Amer)   > 60 


 


Random Glucose   77 


 


Calcium   9.3 


 


Phosphorus   3.0 


 


Magnesium   2.2 


 


Total Bilirubin   0.4 


 


AST   29 


 


ALT   23 


 


Alkaline Phosphatase   90 


 


Troponin I   


 


Total Protein   6.9 


 


Albumin   3.8 


 


Globulin   3.1 


 


Albumin/Globulin Ratio   1.2 


 


Triglycerides   83 


 


Cholesterol   169 


 


LDL Cholesterol Direct   93 


 


HDL Cholesterol   49 


 


Thyroxine (T4)    8.9


 


TSH 3rd Generation    1.63


 


Influenza Typ A,B (EIA)   














Attending/Attestation





- Attestation


I have personally seen and examined this patient.: Yes


I have fully participated in the care of the patient.: Yes


I have reviewed all pertinent clinical information: Yes


Notes (Text): 


I have seen and examined the patient at bedside. Agree with the above note with 

the following additions/ exceptions: Briefly this is  62 year old female with 

history of uncontrolled seizures, GERD, migraine, headaches, panic attack, 

cholecystectomy, appendectomy who was scheduled for colonoscopy today however 

in SDS was found to have new onset Afib with RVR. Patient denies any chest pain

, sob, palpitation or any other complaints. First set of trop is negative. Will 

order echo. CHADSVASC score is 1. HASBLED is 2. Patient reports bleeding in 

stools for the past few days. Will hold anticoagulation for now. Cardiology 

eval appreciated. Will continue cardizem. Follow up blood cultures and procal 

as she reported tactile fever. Upon discharge patient will follow up with Dr Abreu.


Dr Brandi Abbasi

## 2018-03-14 LAB
ALBUMIN SERPL-MCNC: 3.8 G/DL (ref 3–4.8)
ALBUMIN/GLOB SERPL: 1.2 {RATIO} (ref 1.1–1.8)
ALT SERPL-CCNC: 23 U/L (ref 7–56)
APPEARANCE UR: CLEAR
AST SERPL-CCNC: 29 U/L (ref 14–36)
BASOPHILS # BLD AUTO: 0.03 K/MM3 (ref 0–2)
BASOPHILS NFR BLD: 0.3 % (ref 0–3)
BILIRUB UR-MCNC: (no result) MG/DL
BUN SERPL-MCNC: 18 MG/DL (ref 7–21)
CALCIUM SERPL-MCNC: 9.3 MG/DL (ref 8.4–10.5)
COLOR UR: YELLOW
EOSINOPHIL # BLD: 0.2 10*3/UL (ref 0–0.7)
EOSINOPHIL NFR BLD: 2.4 % (ref 1.5–5)
ERYTHROCYTE [DISTWIDTH] IN BLOOD BY AUTOMATED COUNT: 13 % (ref 11.5–14.5)
GFR NON-AFRICAN AMERICAN: > 60
GLUCOSE UR STRIP-MCNC: NEGATIVE MG/DL
GRANULOCYTES # BLD: 5.79 10*3/UL (ref 1.4–6.5)
GRANULOCYTES NFR BLD: 67.3 % (ref 50–68)
HDLC SERPL-MCNC: 49 MG/DL (ref 29–60)
HGB BLD-MCNC: 14 G/DL (ref 12–16)
LDLC SERPL-MCNC: 93 MG/DL (ref 0–129)
LEUKOCYTE ESTERASE UR-ACNC: NEGATIVE LEU/UL
LYMPHOCYTES # BLD: 2 10*3/UL (ref 1.2–3.4)
LYMPHOCYTES NFR BLD AUTO: 23.4 % (ref 22–35)
MCH RBC QN AUTO: 29.9 PG (ref 25–35)
MCHC RBC AUTO-ENTMCNC: 33.1 G/DL (ref 31–37)
MCV RBC AUTO: 90.4 FL (ref 80–105)
MONOCYTES # BLD AUTO: 0.6 10*3/UL (ref 0.1–0.6)
MONOCYTES NFR BLD: 6.6 % (ref 1–6)
PH UR STRIP: 6 [PH] (ref 4.7–8)
PLATELET # BLD: 325 10^3/UL (ref 120–450)
PMV BLD AUTO: 9.7 FL (ref 7–11)
PROT UR STRIP-MCNC: NEGATIVE MG/DL
RBC # BLD AUTO: 4.68 10^6/UL (ref 3.5–6.1)
RBC # UR STRIP: NEGATIVE /UL
SP GR UR STRIP: >= 1.03 (ref 1–1.03)
T4 SERPL-MCNC: 8.9 UG/DL (ref 5.5–11)
UROBILINOGEN UR STRIP-ACNC: 0.2 E.U./DL
WBC # BLD AUTO: 8.6 10^3/UL (ref 4.5–11)

## 2018-03-14 RX ADMIN — PANTOPRAZOLE SODIUM SCH MG: 40 TABLET, DELAYED RELEASE ORAL at 10:34

## 2018-03-14 RX ADMIN — THERA TABS SCH TAB: TAB at 10:34

## 2018-03-14 NOTE — CARD
--------------- APPROVED REPORT --------------





EXAM: Two-dimensional and M-mode echocardiogram with Doppler and 

color Doppler.



INDICATION

Atrial Fibrillation



2D DIMENSIONS 

Left Atrium (2D)3.9   (1.6-4.0cm)IVSd1.0   (0.7-1.1cm)

LVDd4.4   (3.9-5.9cm)PWd0.9   (0.7-1.1cm)

LVDs3.3   (2.5-4.0cm)FS (%) 24.9   %

LVEF (%)49.6   (>50%)



M-Mode DIMENSIONS 

Aortic Root2.80   (2.2-3.7cm)Aortic Cusp Exc.0.80   (1.5-2.0cm)



Aortic Valve

AoV Peak Avywutfg930.0cm/Mikaela Peak GR.8mmHg



Mitral Valve

E/A ratio0.0



TDI

E/Lateral E'0.0E/Medial E'0.0



Tricuspid Valve

TR Peak [id number]/sRAP [id number] Peak Gr.29mmHg

[id number]



 LEFT VENTRICLE 

The left ventricle is normal size. There is normal left ventricular 

wall thickness. The systolic function is mildly impaired.EF-45-50% 

There is mild hypokinesis in the basal inferolateral wall. 

Transmitral Doppler flow pattern is Grade II-pseudonormal filling 

dynamics. No left ventricle thrombus noted on this study. There is no 

ventricular septal defect visualized. There is no left ventricular 

aneurysm. There is no mass noted in the left ventricle.



 RIGHT VENTRICLE 

The right ventricle is normal size. There is normal right ventricular 

wall thickness. The right ventricular systolic function is normal.



 ATRIA 

The left atrium size is normal. The right atrium size is normal. The 

interatrial septum is intact with no evidence for an atrial septal 

defect.



 AORTIC VALVE 

The aortic valve is calcified and displays decreased opening. There 

is mild aortic regurgitation. Mils AS There is no aortic valvular 

vegetation.



 MITRAL VALVE 

The mitral valve is thickened but opens well. Mitral annular 

calcification is mild. Mitral regurgitation is mild. There is no 

mitral valve stenosis. There is no evidence of mitral valve prolapse.



 TRICUSPID VALVE 

The tricuspid valve leaflets are thickened or calcified, but open 

well. There is trace to mild tricuspid regurgitation.RVSP-39 mmof hg. 

There is no tricuspid valve stenosis. There is no tricuspid valve 

prolapse or vegetation.



 PULMONIC VALVE 

The pulmonic valve is borderline thickened. There is mild pulmonic 

valvular regurgitation. There is no pulmonic valvular stenosis.



 GREAT VESSELS 

The aortic root is normal in size. The ascending aorta is normal in 

size. The pulmonary artery is normal. The IVC is normal in size and 

collapses >50% with inspiration.



 PERICARDIAL EFFUSION 

There is no pleural effusion. There is no pericardial effusion.



<Conclusion>

The left ventricle is normal size.

There is normal left ventricular wall thickness.

The systolic function is mildly impaired.EF-45-50%

There is mild aortic regurgitation.

Mitral regurgitation is mild.

There is trace to mild tricuspid regurgitation.RVSP-39 mmof hg.

The IVC is normal in size and collapses >50% with inspiration.

There is no pericardial effusion.

## 2018-03-14 NOTE — CP.PCM.CON
History of Present Illness





- History of Present Illness


History of Present Illness: 





Mrs. Mortensen is a 62-year-old woman with refractory epilepsy, with recent 

paroxysmal atrial fibrillation, who has had fevers and was to have a colonoscopy

, but had several witnessed seizures.  She has been managed by Dr. Rodriguez (

epileptologist), with multiple AEDs, including recent experimental medications.

  She generally has 2-3 seizures a week, but has had more recently since her 

 has had a major stroke and requires care at home.  She has a 

significant amount of stress as well.  When I spoke with the patient, she was 

at baseline, pleasant and conversant. 





Review of Systems





- Review of Systems


All systems: reviewed and no additional remarkable complaints except





Past Patient History





- Infectious Disease


Hx of Infectious Diseases: None





- Tetanus Immunizations


Tetanus Immunization: Unknown





- Past Social History


Smoking Status: Never Smoked





- CARDIAC


Hx Cardiac Disorders: Yes


Hx Heart Murmur: Yes





- PULMONARY


Hx Respiratory Disorders: No





- NEUROLOGICAL


Hx Neurological Disorder: Yes


Hx Seizures: Yes





- HEENT


Hx HEENT Problems: No





- RENAL


Hx Chronic Kidney Disease: No





- ENDOCRINE/METABOLIC


Hx Endocrine Disorders: No





- HEMATOLOGICAL/ONCOLOGICAL


Hx Blood Disorders: No





- INTEGUMENTARY


Hx Dermatological Problems: No





- MUSCULOSKELETAL/RHEUMATOLOGICAL


Hx Musculoskeletal Disorders: Yes (lumbar radiculopathy,sciatica,)


Hx Falls: Yes





- GASTROINTESTINAL


Hx Gastrointestinal Disorders: Yes


Hx Gall Bladder Disease: Yes


Hx Gastroesophageal Reflux: Yes





- GENITOURINARY/GYNECOLOGICAL


Hx Genitourinary Disorders: Yes (c/s x 1)





- PSYCHIATRIC


Hx Psychophysiologic Disorder: Yes


Hx Panic Symptoms: Yes


Hx Substance Use: No





- SURGICAL HISTORY


Hx Surgeries: Yes (sx to nose from fall hitting a bowling ball,stimulator 

removed fr l chest,)


Hx Cholecystectomy: Yes


Other/Comment: c/s x 1





- ANESTHESIA


Hx Anesthesia Reactions: No


Hx Malignant Hyperthermia: No





Meds


Allergies/Adverse Reactions: 


 Allergies











Allergy/AdvReac Type Severity Reaction Status Date / Time


 


No Known Allergies Allergy   Verified 03/13/18 11:58














- Medications


Medications: 


 Current Medications





Acetaminophen (Tylenol 325mg Tab)  650 mg PO Q6H PRN


   PRN Reason: Fever >100.4 F


Diltiazem HCl (Cardizem)  30 mg PO Q8 [facility]


Home Med (Home Med)  1 unit PO HS [facility]


   Last Admin: 03/13/18 22:10 Dose:  1 unit


Lorazepam (Ativan)  1 mg IVP Q2H PRN; Protocol


   PRN Reason: Seizure activity


   Last Admin: 03/14/18 01:15 Dose:  1 mg


Multivitamins (Thera Tab)  1 tab PO DAILY [facility]


   Last Admin: 03/14/18 10:34 Dose:  1 tab


Non-Formulary Medication (Clobazam [Onfi])  10 mg PO BID [facility]


   Last Admin: 03/14/18 10:30 Dose:  Not Given


Pantoprazole Sodium (Protonix Ec Tab)  40 mg PO DAILY [facility]


   Last Admin: 03/14/18 10:34 Dose:  40 mg


Topiramate (Topamax)  50 mg PO BID [facility]


   PRN Reason: Protocol


   Last Admin: 03/14/18 10:33 Dose:  50 mg


Zonisamide (Zonegran)  200 mg PO AMHS [facility]


   Last Admin: 03/13/18 22:10 Dose:  200 mg











Physical Exam





- Neurological Exam


Neurological exam: Alert, CN II-XII Intact, Normal Gait, Oriented x3, Reflexes 

Normal





Results





- Vital Signs


Recent Vital Signs: 


 Last Vital Signs











Temp  97.6 F   03/14/18 06:00


 


Pulse  139 H  03/14/18 09:31


 


Resp  21   03/14/18 06:00


 


BP  136/52 L  03/14/18 06:00


 


Pulse Ox  98   03/14/18 06:00














- Labs


Result Diagrams: 


 03/14/18 06:45





 03/14/18 06:45


Labs: 


 Laboratory Results - last 24 hr











  03/13/18 03/13/18 03/13/18





  13:43 15:51 20:54


 


WBC   


 


RBC   


 


Hgb   


 


Hct   


 


MCV   


 


MCH   


 


MCHC   


 


RDW   


 


Plt Count   


 


MPV   


 


Gran %   


 


Lymph % (Auto)   


 


Mono % (Auto)   


 


Eos % (Auto)   


 


Baso % (Auto)   


 


Gran #   


 


Lymph # (Auto)   


 


Mono # (Auto)   


 


Eos # (Auto)   


 


Baso # (Auto)   


 


Sodium   


 


Potassium   


 


Chloride   


 


Carbon Dioxide   


 


Anion Gap   


 


BUN   


 


Creatinine   


 


Est GFR ( Amer)   


 


Est GFR (Non-Af Amer)   


 


Random Glucose   


 


Calcium   


 


Phosphorus   


 


Magnesium   


 


Total Bilirubin   


 


AST   


 


ALT   


 


Alkaline Phosphatase   


 


Troponin I   < 0.01  < 0.01


 


Total Protein   


 


Albumin   


 


Globulin   


 


Albumin/Globulin Ratio   


 


Triglycerides   


 


Cholesterol   


 


LDL Cholesterol Direct   


 


HDL Cholesterol   


 


Thyroxine (T4)   


 


TSH 3rd Generation   


 


Urine Color   


 


Urine Appearance   


 


Urine pH   


 


Ur Specific Gravity   


 


Urine Protein   


 


Urine Glucose (UA)   


 


Urine Ketones   


 


Urine Blood   


 


Urine Nitrate   


 


Urine Bilirubin   


 


Urine Urobilinogen   


 


Ur Leukocyte Esterase   


 


Influenza Typ A,B (EIA)  Negative for flu a/b  














  03/14/18 03/14/18 03/14/18





  06:45 06:45 06:45


 


WBC  8.6  D  


 


RBC  4.68  


 


Hgb  14.0  


 


Hct  42.3  


 


MCV  90.4  


 


MCH  29.9  


 


MCHC  33.1  


 


RDW  13.0  


 


Plt Count  325  


 


MPV  9.7  


 


Gran %  67.3  


 


Lymph % (Auto)  23.4  


 


Mono % (Auto)  6.6 H  


 


Eos % (Auto)  2.4  


 


Baso % (Auto)  0.3  


 


Gran #  5.79  


 


Lymph # (Auto)  2.0  


 


Mono # (Auto)  0.6  


 


Eos # (Auto)  0.2  


 


Baso # (Auto)  0.03  


 


Sodium   143 


 


Potassium   4.2 


 


Chloride   112 H 


 


Carbon Dioxide   20 L 


 


Anion Gap   16 


 


BUN   18 


 


Creatinine   0.9 


 


Est GFR ( Amer)   > 60 


 


Est GFR (Non-Af Amer)   > 60 


 


Random Glucose   77 


 


Calcium   9.3 


 


Phosphorus   3.0 


 


Magnesium   2.2 


 


Total Bilirubin   0.4 


 


AST   29 


 


ALT   23 


 


Alkaline Phosphatase   90 


 


Troponin I   


 


Total Protein   6.9 


 


Albumin   3.8 


 


Globulin   3.1 


 


Albumin/Globulin Ratio   1.2 


 


Triglycerides   83 


 


Cholesterol   169 


 


LDL Cholesterol Direct   93 


 


HDL Cholesterol   49 


 


Thyroxine (T4)    8.9


 


TSH 3rd Generation    1.63


 


Urine Color   


 


Urine Appearance   


 


Urine pH   


 


Ur Specific Gravity   


 


Urine Protein   


 


Urine Glucose (UA)   


 


Urine Ketones   


 


Urine Blood   


 


Urine Nitrate   


 


Urine Bilirubin   


 


Urine Urobilinogen   


 


Ur Leukocyte Esterase   


 


Influenza Typ A,B (EIA)   














  03/14/18





  10:10


 


WBC 


 


RBC 


 


Hgb 


 


Hct 


 


MCV 


 


MCH 


 


MCHC 


 


RDW 


 


Plt Count 


 


MPV 


 


Gran % 


 


Lymph % (Auto) 


 


Mono % (Auto) 


 


Eos % (Auto) 


 


Baso % (Auto) 


 


Gran # 


 


Lymph # (Auto) 


 


Mono # (Auto) 


 


Eos # (Auto) 


 


Baso # (Auto) 


 


Sodium 


 


Potassium 


 


Chloride 


 


Carbon Dioxide 


 


Anion Gap 


 


BUN 


 


Creatinine 


 


Est GFR ( Amer) 


 


Est GFR (Non-Af Amer) 


 


Random Glucose 


 


Calcium 


 


Phosphorus 


 


Magnesium 


 


Total Bilirubin 


 


AST 


 


ALT 


 


Alkaline Phosphatase 


 


Troponin I 


 


Total Protein 


 


Albumin 


 


Globulin 


 


Albumin/Globulin Ratio 


 


Triglycerides 


 


Cholesterol 


 


LDL Cholesterol Direct 


 


HDL Cholesterol 


 


Thyroxine (T4) 


 


TSH 3rd Generation 


 


Urine Color  Yellow


 


Urine Appearance  Clear


 


Urine pH  6.0


 


Ur Specific Gravity  >= 1.030


 


Urine Protein  Negative


 


Urine Glucose (UA)  Negative


 


Urine Ketones  >=80


 


Urine Blood  Negative


 


Urine Nitrate  Negative


 


Urine Bilirubin  Small H


 


Urine Urobilinogen  0.2


 


Ur Leukocyte Esterase  Negative


 


Influenza Typ A,B (EIA) 














Assessment & Plan


(1) Epilepsy


Assessment and Plan: 


I recommend continuing the current AEDs and furthermore suggest epilepsy 

surgery.  I have recommended Dr. Germania Maria at [facility], [place] to her, who is a 

functional epilepsy surgeon.  Otherwise, we will treat her seizures with Ativan 

2-4 mg IV depending on the severity, and she will follow up with Dr. Rodriguez.


Status: Acute   





(2) Atrial fibrillation with RVR


Assessment and Plan: 


I recommend stroke prevention with Eliquis 5 mg BID.


Status: Acute

## 2018-03-14 NOTE — PCM.RRT
<ShielaClaudia gatica - Last Filed: 03/14/18 09:54>





RRT Nurse Assessment





- Situation


Date: 03/14/18


Time RRT was called: 09:34


RRT Responder Arrival Time: 09:35


RRT Location:: [address] [place] [address]


Room Number: 374-1


RRT Reason for Call: Change in Mental Status


RRT Called By: RN





- IV


IV Inserted during RRT?: No





- Respiratory


Oxygen Delivery Method: Nasal Cannula @L/min


Oxygen Flow Rate: 2


Received Nebulizer Treatments:: No


Was the Patient Ventilated with Bag/Mask 100% O2?: No


Secretions Suctioned?: No


Was the Patient Intubated?: No


Was the Patient Placed on a Ventilator?: No





- Medication


Medications Administered During RRT: 500mg keppra IVPB ONCE





- Diagnostic Test Ordered


EKG: No


Chest X-Ray: No


CT Scan: No


Other Diagnostic Test Ordered: MRI brain, EEG


CPR started during RRT?: No





- Vital Signs


Vital Sign: 


Rapid Response Vital Sign





Blood Pressure                   125/71


Pulse Rate                       70


Respiratory Rate                 20


Temperature                      99.1 F


Oxygen Saturation                100











- Finger Stick Blood Glucose


Finger Stick Blood Glucose: 125





- Dahlgren Coma Scale


Coma Scale Eye Opening: Spontaneous


Coma Scale Motor: Obeys Commands Movement


Coma Scale Verbal: Oriented





- Time RRT Ended


Time RRT Ended: 09:40





- Vital Signs at end of RRT


Vital Signs at end of RRT: 


Rapid Response End Vital Sign





Blood Pressure                   133/59


Pulse Rate                       65


Respiratory Rate                 18


Temperature                      99.1 F


O2 Sat by Pulse Oximetry         100











- Recommendations


5) RRT Level of Care Recommendations: Remain in current setting


Notifications: Attending Physician





I.Reason for RRT





- A) Acute Change in Patient:


(Select all that apply): Acute change in mental status





- Neurological Status


(Select all that apply): Alert, Responsive, Oriented, Verbal, Follows Commands





- Respiratory


Oxygen Delivery Method: Nasal Cannula @L/min


Oxygen Flow Rate: 2





- Constitutional


Appears: No Acute Distress





- Head


Head Exam: ATRAUMATIC, NORMAL INSPECTION, NORMOCEPHALIC


Additional Comments: 





Tongue had bite marks. 





- Eyes


Eye Exam: Normal appearance, PERRL





- Respiratory Exam


Respiratory Exam: Clear to Ausculation Bilateral, NORMAL BREATHING PATTERN.  

absent: Rales, Rhonchi, Wheezes





- Cardiovascular Exam


Cardiovascular Exam: REGULAR RHYTHM, +S1, +S2.  absent: Gallop, Rubs, Murmur





- GI/Abdominal Exam


GI & Abdominal Exam: Soft, Normal Bowel Sounds.  absent: Tenderness (r), Mass, 

Rebound





- Neurological Exam


Neurological Exam: Alert, Awake, CN II-XII Intact, Oriented x3





- Extremities Exam


Extremities Exam: Normal Inspection.  absent: Calf Tenderness, Pedal Edema





Plan





- Assessment of Findings&Treatment Plan





This is a 61yo F with PMH of seizure disorder had rapid response called for 

seizure like activity and change in mental status. RR team responded 

immediately. Patient was noted to have tongue biting, but no incontinence upon 

examination. She is A&O x 3 and denies any chest pain, shortness of breath, 

numbness/tingling, fever/chills, vision or hearing changes. PMD, Dr. Abbasi at 

bedside. Vitals and labs reviewed from the am and were within normal limits. 

Neurology is already consulted on this patient.





Plan: 


MRI brain 


EEG 


Kera 500 IVPB





Case seen, discussed and reviewed with attending. 


ANDRY Zeng PGY2








<Brandi Abbasi B - Last Filed: 03/16/18 10:47>





RRT Nurse Assessment





- Vital Signs


Vital Sign: 


Rapid Response Vital Sign





Blood Pressure                   125/71


Pulse Rate                       70


Respiratory Rate                 20


Temperature                      99.1 F


Oxygen Saturation                100











- Vital Signs at end of RRT


Vital Signs at end of RRT: 


Rapid Response End Vital Sign





Blood Pressure                   133/59


Pulse Rate                       65


Respiratory Rate                 18


Temperature                      99.1 F


O2 Sat by Pulse Oximetry         100











Attending/Attestation





- Attestation


I have personally seen and examined this patient.: Yes


I have fully participated in the care of the patient.: Yes


I have reviewed all pertinent clinical information, including history, physical 

exam and plan: Yes


Notes (Text): 





I have seen and examined the patient at bedside. Agree with the above 

management. Discussed with neurologist as well.

## 2018-03-14 NOTE — CP.PCM.CON
History of Present Illness





- History of Present Illness


History of Present Illness: 





Patient seen and examined at bedside earlier today. This is a 62F with PMHx of 

seizure disorder presented to emergency department from the endoscopy suite for 

sudden onset of new Afib with RVR. Patient states she was scheduled for a 

colonoscopy and EGD today when she was found to be in atrial fibrillation with 

RVR on the monitor. She denied any acute complaints of chest pain, palpitations

, lightheadedness, SOB at the time. Patient does not have a history of Afib or 

any cardiac history. Patient also reports a single bloody BM that occurred 2 

days ago. Stool was loose with bright red blood and maroon stool. Her last 

colonoscopy was 2 years ago and she has a hx of colon polyps and hemorrhoids as 

well as severe symptomatic GERD. No personal hx of colon cancer or esophageal 

cancer. Patient also disclosed a history of frequent seizures and takes several 

seizure medications. She was enrolled in a blinded drug trial where she took an 

unlabled seizure medication for 6 weeks. She stopped this medication 3 weeks 

ago because it did not give her any symptomatic relief. She has on average 2 

seizures per week. Her seizures occur during sleep and are usually witnessed by 

her . Patient denied recent travel or immobilization. No sick contacts. 

She has been eating and drinking normally. With the exception of the above and 

her recent bowel prep before colonoscopy, she has normal daily bowel movements. 

She got a flu shot this year. This morning and last night she had multiple 

seizure episodes. She has been seen by the neurologist and recommended to 

follow with neuro surgery for further care





Review of Systems





- Review of Systems


Review of Systems: 





As per HPI





Past Patient History





- Infectious Disease


Hx of Infectious Diseases: None





- Tetanus Immunizations


Tetanus Immunization: Unknown





- Past Social History


Smoking Status: Never Smoked





- CARDIAC


Hx Cardiac Disorders: Yes


Hx Heart Murmur: Yes





- PULMONARY


Hx Respiratory Disorders: No





- NEUROLOGICAL


Hx Neurological Disorder: Yes


Hx Seizures: Yes





- HEENT


Hx HEENT Problems: No





- RENAL


Hx Chronic Kidney Disease: No





- ENDOCRINE/METABOLIC


Hx Endocrine Disorders: No





- HEMATOLOGICAL/ONCOLOGICAL


Hx Blood Disorders: No





- INTEGUMENTARY


Hx Dermatological Problems: No





- MUSCULOSKELETAL/RHEUMATOLOGICAL


Hx Musculoskeletal Disorders: Yes (lumbar radiculopathy,sciatica,)


Hx Falls: Yes





- GASTROINTESTINAL


Hx Gastrointestinal Disorders: Yes


Hx Gall Bladder Disease: Yes


Hx Gastroesophageal Reflux: Yes





- GENITOURINARY/GYNECOLOGICAL


Hx Genitourinary Disorders: Yes (c/s x 1)





- PSYCHIATRIC


Hx Psychophysiologic Disorder: Yes


Hx Panic Symptoms: Yes


Hx Substance Use: No





- SURGICAL HISTORY


Hx Surgeries: Yes (sx to nose from fall hitting a bowling ball,stimulator 

removed fr l chest,)


Hx Cholecystectomy: Yes


Other/Comment: c/s x 1





- ANESTHESIA


Hx Anesthesia Reactions: No


Hx Malignant Hyperthermia: No





Meds


Allergies/Adverse Reactions: 


 Allergies











Allergy/AdvReac Type Severity Reaction Status Date / Time


 


No Known Allergies Allergy   Verified 03/13/18 11:58














- Medications


Medications: 


 Current Medications





Acetaminophen (Tylenol 325mg Tab)  650 mg PO Q6H PRN


   PRN Reason: Fever >100.4 F


Diltiazem HCl (Cardizem)  30 mg PO Q8 [facility]


   Last Admin: 03/14/18 13:47 Dose:  30 mg


Home Med (Home Med)  1 unit PO HS [facility]


   Last Admin: 03/13/18 22:10 Dose:  1 unit


Home Med (Home Med)  2 unit PO DAILY [facility]


   Last Admin: 03/14/18 13:49 Dose:  2 unit


Home Med (Home Med)  3 unit PO HS [facility]


Lorazepam (Ativan)  2 mg IVP Q2H PRN; Protocol


   PRN Reason: Seizure activity


Multivitamins (Thera Tab)  1 tab PO DAILY [facility]


   Last Admin: 03/14/18 10:34 Dose:  1 tab


Non-Formulary Medication (Clobazam [Onfi])  10 mg PO BID [facility]


   Last Admin: 03/14/18 10:30 Dose:  Not Given


Pantoprazole Sodium (Protonix Ec Tab)  40 mg PO DAILY [facility]


   Last Admin: 03/14/18 10:34 Dose:  40 mg


Topiramate (Topamax)  50 mg PO BID [facility]


   PRN Reason: Protocol


   Last Admin: 03/14/18 10:33 Dose:  50 mg











Physical Exam





- Constitutional


Appears: Well, Non-toxic, No Acute Distress





- Head Exam


Head Exam: ATRAUMATIC, NORMAL INSPECTION, NORMOCEPHALIC





- Eye Exam


Eye Exam: EOMI, Normal appearance, PERRL





- ENT Exam


ENT Exam: Mucous Membranes Moist, Normal Exam





- Respiratory Exam


Respiratory Exam: Clear to Auscultation Bilateral, NORMAL BREATHING PATTERN





- Cardiovascular Exam


Cardiovascular Exam: REGULAR RHYTHM, RRR, +S1, +S2





- GI/Abdominal Exam


GI & Abdominal Exam: Normal Bowel Sounds, Soft


Additional comments: 





Non tender. Distended. No guarding 





- Neurological Exam


Neurological exam: Alert, Oriented x3





- Psychiatric Exam


Psychiatric exam: Normal Affect, Normal Mood





- Skin


Skin Exam: Dry, Normal Color





Results





- Vital Signs


Recent Vital Signs: 


 Last Vital Signs











Temp  97.6 F   03/14/18 06:00


 


Pulse  81   03/14/18 14:00


 


Resp  21   03/14/18 06:00


 


BP  140/65   03/14/18 13:47


 


Pulse Ox  98   03/14/18 06:00














- Labs


Result Diagrams: 


 03/14/18 06:45





 03/14/18 06:45


Labs: 


 Laboratory Results - last 24 hr











  03/13/18 03/13/18 03/13/18





  15:51 15:51 20:54


 


WBC   


 


RBC   


 


Hgb   


 


Hct   


 


MCV   


 


MCH   


 


MCHC   


 


RDW   


 


Plt Count   


 


MPV   


 


Gran %   


 


Lymph % (Auto)   


 


Mono % (Auto)   


 


Eos % (Auto)   


 


Baso % (Auto)   


 


Gran #   


 


Lymph # (Auto)   


 


Mono # (Auto)   


 


Eos # (Auto)   


 


Baso # (Auto)   


 


Sodium   


 


Potassium   


 


Chloride   


 


Carbon Dioxide   


 


Anion Gap   


 


BUN   


 


Creatinine   


 


Est GFR ( Amer)   


 


Est GFR (Non-Af Amer)   


 


POC Glucose (mg/dL)   


 


Random Glucose   


 


Hemoglobin A1c   


 


Calcium   


 


Phosphorus   


 


Magnesium   


 


Total Bilirubin   


 


AST   


 


ALT   


 


Alkaline Phosphatase   


 


Troponin I    < 0.01


 


C-React Prot High Sens  5.63 H  


 


Total Protein   


 


Albumin   


 


Globulin   


 


Albumin/Globulin Ratio   


 


Triglycerides   


 


Cholesterol   


 


LDL Cholesterol Direct   


 


HDL Cholesterol   


 


Procalcitonin   < 0.05 L 


 


Thyroxine (T4)   


 


TSH 3rd Generation   


 


Prolactin   


 


Urine Color   


 


Urine Appearance   


 


Urine pH   


 


Ur Specific Gravity   


 


Urine Protein   


 


Urine Glucose (UA)   


 


Urine Ketones   


 


Urine Blood   


 


Urine Nitrate   


 


Urine Bilirubin   


 


Urine Urobilinogen   


 


Ur Leukocyte Esterase   














  03/14/18 03/14/18 03/14/18





  06:45 06:45 06:45


 


WBC  8.6  D  


 


RBC  4.68  


 


Hgb  14.0  


 


Hct  42.3  


 


MCV  90.4  


 


MCH  29.9  


 


MCHC  33.1  


 


RDW  13.0  


 


Plt Count  325  


 


MPV  9.7  


 


Gran %  67.3  


 


Lymph % (Auto)  23.4  


 


Mono % (Auto)  6.6 H  


 


Eos % (Auto)  2.4  


 


Baso % (Auto)  0.3  


 


Gran #  5.79  


 


Lymph # (Auto)  2.0  


 


Mono # (Auto)  0.6  


 


Eos # (Auto)  0.2  


 


Baso # (Auto)  0.03  


 


Sodium   143 


 


Potassium   4.2 


 


Chloride   112 H 


 


Carbon Dioxide   20 L 


 


Anion Gap   16 


 


BUN   18 


 


Creatinine   0.9 


 


Est GFR ( Amer)   > 60 


 


Est GFR (Non-Af Amer)   > 60 


 


POC Glucose (mg/dL)   


 


Random Glucose   77 


 


Hemoglobin A1c    5.6


 


Calcium   9.3 


 


Phosphorus   3.0 


 


Magnesium   2.2 


 


Total Bilirubin   0.4 


 


AST   29 


 


ALT   23 


 


Alkaline Phosphatase   90 


 


Troponin I   


 


C-React Prot High Sens   


 


Total Protein   6.9 


 


Albumin   3.8 


 


Globulin   3.1 


 


Albumin/Globulin Ratio   1.2 


 


Triglycerides   83 


 


Cholesterol   169 


 


LDL Cholesterol Direct   93 


 


HDL Cholesterol   49 


 


Procalcitonin   


 


Thyroxine (T4)   


 


TSH 3rd Generation   


 


Prolactin   


 


Urine Color   


 


Urine Appearance   


 


Urine pH   


 


Ur Specific Gravity   


 


Urine Protein   


 


Urine Glucose (UA)   


 


Urine Ketones   


 


Urine Blood   


 


Urine Nitrate   


 


Urine Bilirubin   


 


Urine Urobilinogen   


 


Ur Leukocyte Esterase   














  03/14/18 03/14/18 03/14/18





  06:45 09:47 10:10


 


WBC   


 


RBC   


 


Hgb   


 


Hct   


 


MCV   


 


MCH   


 


MCHC   


 


RDW   


 


Plt Count   


 


MPV   


 


Gran %   


 


Lymph % (Auto)   


 


Mono % (Auto)   


 


Eos % (Auto)   


 


Baso % (Auto)   


 


Gran #   


 


Lymph # (Auto)   


 


Mono # (Auto)   


 


Eos # (Auto)   


 


Baso # (Auto)   


 


Sodium   


 


Potassium   


 


Chloride   


 


Carbon Dioxide   


 


Anion Gap   


 


BUN   


 


Creatinine   


 


Est GFR ( Amer)   


 


Est GFR (Non-Af Amer)   


 


POC Glucose (mg/dL)   125 H 


 


Random Glucose   


 


Hemoglobin A1c   


 


Calcium   


 


Phosphorus   


 


Magnesium   


 


Total Bilirubin   


 


AST   


 


ALT   


 


Alkaline Phosphatase   


 


Troponin I   


 


C-React Prot High Sens   


 


Total Protein   


 


Albumin   


 


Globulin   


 


Albumin/Globulin Ratio   


 


Triglycerides   


 


Cholesterol   


 


LDL Cholesterol Direct   


 


HDL Cholesterol   


 


Procalcitonin   


 


Thyroxine (T4)  8.9  


 


TSH 3rd Generation  1.63  


 


Prolactin   


 


Urine Color    Yellow


 


Urine Appearance    Clear


 


Urine pH    6.0


 


Ur Specific Gravity    >= 1.030


 


Urine Protein    Negative


 


Urine Glucose (UA)    Negative


 


Urine Ketones    >=80


 


Urine Blood    Negative


 


Urine Nitrate    Negative


 


Urine Bilirubin    Small H


 


Urine Urobilinogen    0.2


 


Ur Leukocyte Esterase    Negative














  03/14/18





  10:15


 


WBC 


 


RBC 


 


Hgb 


 


Hct 


 


MCV 


 


MCH 


 


MCHC 


 


RDW 


 


Plt Count 


 


MPV 


 


Gran % 


 


Lymph % (Auto) 


 


Mono % (Auto) 


 


Eos % (Auto) 


 


Baso % (Auto) 


 


Gran # 


 


Lymph # (Auto) 


 


Mono # (Auto) 


 


Eos # (Auto) 


 


Baso # (Auto) 


 


Sodium 


 


Potassium 


 


Chloride 


 


Carbon Dioxide 


 


Anion Gap 


 


BUN 


 


Creatinine 


 


Est GFR ( Amer) 


 


Est GFR (Non-Af Amer) 


 


POC Glucose (mg/dL) 


 


Random Glucose 


 


Hemoglobin A1c 


 


Calcium 


 


Phosphorus 


 


Magnesium 


 


Total Bilirubin 


 


AST 


 


ALT 


 


Alkaline Phosphatase 


 


Troponin I 


 


C-React Prot High Sens 


 


Total Protein 


 


Albumin 


 


Globulin 


 


Albumin/Globulin Ratio 


 


Triglycerides 


 


Cholesterol 


 


LDL Cholesterol Direct 


 


HDL Cholesterol 


 


Procalcitonin 


 


Thyroxine (T4) 


 


TSH 3rd Generation 


 


Prolactin  21.2 H


 


Urine Color 


 


Urine Appearance 


 


Urine pH 


 


Ur Specific Gravity 


 


Urine Protein 


 


Urine Glucose (UA) 


 


Urine Ketones 


 


Urine Blood 


 


Urine Nitrate 


 


Urine Bilirubin 


 


Urine Urobilinogen 


 


Ur Leukocyte Esterase 














Assessment & Plan





- Assessment and Plan (Free Text)


Assessment: 





62 yr old F with seizure disorder and new onset A fib with RVR when she 

presented for outpatient EGD/ colonoscopy for GI bleeding, now rate controlled 

seen by cardiologist and cleared for calderon procedural anesthesia. She had 

several short episodes of seizures on several anti seizure medications 

yesterday and this morning. Neurology recommendations appreciated. 


Plan: 





- NPO past midnight for EGD/ colonoscopy in am


- Clear liquid diet today


- 2 tabs of dulcolax po and golytely to be started at 2 pm


- Ate small amount of scrambled eggs in am- will give two tap water enema 

tonight


- Patient consents to the procedure


- Risks, benefits and alternatives explained


- Neurology and cardiology recommendations appreciated 


- Discussed with the primary team- lovenox and eliquid on hold till the 

procedure


- Will give further recommendations after the procedure

## 2018-03-14 NOTE — CP.PCM.PN
<MarinaRenato Linn - Last Filed: 03/14/18 15:02>





Subjective





- Date & Time of Evaluation


Date of Evaluation: 03/14/18


Time of Evaluation: 15:02





- Subjective


Subjective: 


Medicine progress note: Dr. LUIZA Abbasi


Patient seen and examined at bedside.  Patient had an RRT called due to 

seizure.  EEG, CT Head, and Keppra 500 were ordered.  Other than that patient 

is doing well, no complaints.





Objective





- Vital Signs/Intake and Output


Vital Signs (last 24 hours): 


 











Temp Pulse Resp BP Pulse Ox


 


 97.6 F   81   21   140/65   98 


 


 03/14/18 06:00  03/14/18 14:00  03/14/18 06:00  03/14/18 13:47  03/14/18 06:00








Intake and Output: 


 











 03/14/18 03/14/18





 06:59 18:59


 


Intake Total 20 


 


Output Total 0 


 


Balance 20 














- Medications


Medications: 


 Current Medications





Acetaminophen (Tylenol 325mg Tab)  650 mg PO Q6H PRN


   PRN Reason: Fever >100.4 F


Diltiazem HCl (Cardizem)  30 mg PO Q8 [facility]


   Last Admin: 03/14/18 13:47 Dose:  30 mg


Home Med (Home Med)  1 unit PO HS [facility]


   Last Admin: 03/13/18 22:10 Dose:  1 unit


Home Med (Home Med)  2 unit PO DAILY [facility]


   Last Admin: 03/14/18 13:49 Dose:  2 unit


Home Med (Home Med)  3 unit PO HS [facility]


Lorazepam (Ativan)  2 mg IVP Q2H PRN; Protocol


   PRN Reason: Seizure activity


Multivitamins (Thera Tab)  1 tab PO DAILY [facility]


   Last Admin: 03/14/18 10:34 Dose:  1 tab


Non-Formulary Medication (Clobazam [Onfi])  10 mg PO BID [facility]


   Last Admin: 03/14/18 10:30 Dose:  Not Given


Pantoprazole Sodium (Protonix Ec Tab)  40 mg PO DAILY [facility]


   Last Admin: 03/14/18 10:34 Dose:  40 mg


Topiramate (Topamax)  50 mg PO BID [facility]


   PRN Reason: Protocol


   Last Admin: 03/14/18 10:33 Dose:  50 mg











- Labs


Labs: 


 





 03/14/18 06:45 





 03/14/18 06:45 











- Constitutional


Appears: Well





- Head Exam


Head Exam: ATRAUMATIC, NORMAL INSPECTION, NORMOCEPHALIC





- Eye Exam


Eye Exam: EOMI, Normal appearance, PERRL


Pupil Exam: NORMAL ACCOMODATION, PERRL





- ENT Exam


ENT Exam: Mucous Membranes Moist, Normal Exam





- Neck Exam


Neck Exam: Full ROM, Normal Inspection.  absent: Lymphadenopathy





- Respiratory Exam


Respiratory Exam: Clear to Ausculation Bilateral, NORMAL BREATHING PATTERN





- Cardiovascular Exam


Cardiovascular Exam: REGULAR RHYTHM, +S1, +S2.  absent: Murmur





- GI/Abdominal Exam


GI & Abdominal Exam: Soft, Normal Bowel Sounds.  absent: Tenderness





- Extremities Exam


Extremities Exam: Full ROM, Normal Capillary Refill, Normal Inspection.  absent

: Joint Swelling, Pedal Edema





- Back Exam


Back Exam: NORMAL INSPECTION





- Neurological Exam


Neurological Exam: Alert, Awake, CN II-XII Intact, Normal Gait, Oriented x3





- Psychiatric Exam


Psychiatric exam: Normal Affect, Normal Mood





- Skin


Skin Exam: Dry, Intact, Normal Color, Warm





Assessment and Plan





- Assessment and Plan (Free Text)


Assessment: 


61yo female PMHx seizure disorder presents with new-onset Afib with RVR.





Chest pain r/o ACS


- Troponin q6h: 3 normal


- f/u A1c


- lipid panel wnl


- hold ASA for GI bleed


- f/u Echo


- Heart Healthy Diet


- Cardio Dr. Smiley on board





New-onset Afib with RVR.


- [id number] score 1


- HASBLED score 2


- Cardizem 30mg po q8h as per cardio reccs


- no anticoagulation in light of GI bleed


- f/u TSH, free T4


- f/u Echo


- Cardio Dr. Smiley on board


   - Patient can be started on ASA after Endoscopy tomorrow, per Dr. Smiley, as 

long as normal





GI bleed


- patient has hx of GERD and polyps and hemorrhoids in the past


- no kaushal blood noted and H&H is stable on admission


- Daily H&H


- Hold anticoagulation due to risk of worsening bleed


- GI on consult: Dr. Cleveland


   - Patient set up for endoscopy tomorrow





R calf pain


- No DVT, SCD's placed





Hx of fever


- Resolved on admission


- CXR: NAD


- f/u procalcitonin: negative


- f/u blood cultures x2, UA, urine culture, rapid flu


- Tylenol prn for fever





Hx of seizure disorder


- Restarted on home meds


   Clobazam 10mg po bid


   Vimpat 100mg po hs


   Zonisamide 200mg po amhs


- Ativan 1mg q2h prn seizures


- Seizure and fall precautions


- 60g protein in diet





<Brandi Abbasi B - Last Filed: 03/16/18 10:52>





Objective





- Vital Signs/Intake and Output


Vital Signs (last 24 hours): 


 











Temp Pulse Resp BP Pulse Ox


 


 98.4 F   65   20   119/65   93 L


 


 03/16/18 06:00  03/16/18 06:00  03/16/18 06:00  03/16/18 06:00  03/16/18 06:00








Intake and Output: 


 











 03/16/18 03/16/18





 06:59 18:59


 


Intake Total 360 


 


Output Total 400 


 


Balance -40 














- Medications


Medications: 


 Current Medications





Acetaminophen (Tylenol 325mg Tab)  650 mg PO Q6H PRN


   PRN Reason: Fever >100.4 F


   Last Admin: 03/15/18 03:33 Dose:  650 mg


Acetaminophen (Tylenol 325mg Tab)  650 mg PO Q6H PRN


   PRN Reason: Headache


Diltiazem HCl (Cardizem)  30 mg PO Q8 [facility]


   Last Admin: 03/16/18 05:38 Dose:  30 mg


Home Med (Home Med)  1 unit PO [facility]


   Last Admin: 03/15/18 23:33 Dose:  1 unit


Home Med (Home Med)  2 unit PO DAILY [facility]


   Last Admin: 03/16/18 09:42 Dose:  2 unit


Home Med (Home Med)  3 unit PO [facility]


   Last Admin: 03/15/18 23:34 Dose:  3 unit


Sodium Chloride (Sodium Chloride 0.9%)  1,000 mls @ 100 mls/hr IV .Q10H [facility]


   Last Admin: 03/16/18 05:30 Dose:  100 mls/hr


Lorazepam (Ativan)  2 mg IVP Q2H PRN; Protocol


   PRN Reason: Seizure activity


   Last Admin: 03/15/18 19:08 Dose:  2 mg


Multivitamins (Thera Tab)  1 tab PO DAILY [facility]


   Last Admin: 03/16/18 09:42 Dose:  1 tab


Non-Formulary Medication (Clobazam [Onfi])  10 mg PO BID [facility]


   Last Admin: 03/15/18 17:42 Dose:  Not Given


Pantoprazole Sodium (Protonix Ec Tab)  40 mg PO DAILY [facility]


   Last Admin: 03/16/18 09:41 Dose:  40 mg


Topiramate (Topamax)  50 mg PO BID [facility]


   PRN Reason: Protocol


   Last Admin: 03/16/18 09:41 Dose:  50 mg











- Labs


Labs: 


 





 03/16/18 06:20 





 03/16/18 06:20 











Attending/Attestation





- Attestation


I have personally seen and examined this patient.: Yes


I have fully participated in the care of the patient.: Yes


I have reviewed all pertinent clinical information, including history, physical 

exam and plan: Yes


Notes (Text): 





I have seen and examined the patient at bedside. Agree with the above note with 

the following additions/ exceptions: Briefly this is  62 year old female with 

history of uncontrolled seizures, GERD, migraine, headaches, panic attack, 

cholecystectomy, appendectomy who was scheduled for colonoscopy 1 day ago and 

in SDS was found to have new onset Afib with RVR. Patient denies any chest pain

, sob, palpitation or any other complaints. Serial trop is negative. Echo 

result pending. CHADSVASC score is 1. HASBLED is 2. Patient reports bleeding in 

stools for the past few days. Will hold anticoagulation for now. Patient is 

scheduled for scope tomorrow. Cardiology eval appreciated. Will continue 

cardizem PO. Procal negative. Follow up blood cultures as she reported tactile 

fever.  Upon discharge patient will follow up with Dr Abreu.


Dr Brandi Abbasi

## 2018-03-14 NOTE — CT
PROCEDURE:  CT HEAD WITHOUT CONTRAST.



HISTORY:

Seizure



COMPARISON:

07/21/2017 



TECHNIQUE:

Axial computed tomography images were obtained through the head/brain 

without intravenous contrast.  



Radiation dose:



Total exam DLP = 739.96 mGy-cm.



This CT exam was performed using one or more of the following dose 

reduction techniques: Automated exposure control, adjustment of the 

mA and/or kV according to patient size, and/or use of iterative 

reconstruction technique.



FINDINGS:



HEMORRHAGE:

No intracranial hemorrhage. 



BRAIN:

No mass effect or edema.  No atrophy or chronic microvascular 

ischemic changes.



VENTRICLES:

Unremarkable. No hydrocephalus. 



CALVARIUM:

Unremarkable.



PARANASAL SINUSES:

Unremarkable as visualized. No significant inflammatory changes.



MASTOID AIR CELLS:

Unremarkable as visualized. No inflammatory changes.



OTHER FINDINGS:

None.



IMPRESSION:

No acute intracranial abnormalities. No significant findings to 

account for the clinical presentation.  No significant interval 

change compared to the prior examination(s).

## 2018-03-14 NOTE — PN
DATE:  03/14/2018



CONSULTING PHYSICIAN:  Yolis Smiley M.D.



REASON FOR CONSULTATION:  AFib new onset, converted to normal sinus,

multiple seizure episode last night.  During the seizure, the patient had

SVT.



SUBJECTIVE:  The patient denies any chest pain, shortness of breath, any

palpitation.



OBJECTIVE:

GENERAL:  Not in apparent distress.

VITAL SIGNS:  Temperature afebrile, heart rate 60, blood pressure 136/52.

HEENT:  PERRLA.  Extraocular muscles intact.

NECK:  Supple.  No carotid bruit or thyromegaly.

CHEST:  Clear to auscultation.

HEART:  S1 and S2, regular.

ABDOMEN:  Soft.

EXTREMITIES:  Clubbing and cyanosis negative.



LABORATORY DATA:  Blood workup as follows:  WBC 8.6, hemoglobin 14,

hematocrit 42.3, platelet count 325.  Chemistry shows sodium _____,

potassium _____, chloride 112, carbon dioxide 20, anion gap of 16, BUN 18,

creatinine 0.7.  Troponin 0.01, negative.



IMPRESSION:  A 62-year-old female with past medical history significant for

seizure disorder.  Yesterday, she mentioned that the patient had a cardiac

catheterization done at [facility] by Dr. Win, but said

that was a stress test and was negative.  Admitted yesterday after being

prepped for endoscopy, colonoscopy.  Found to be in atrial fibrillation, 10

mg of Cardizem was given IV and the patient converted to normal sinus. 

Last night, the patient had 3 episodes of seizure, though the patient has

known history of seizure.  She said prior to coming here to hospital, had 2

episodes of seizure at home, but does not want to come to the hospital,

because she takes care of the .  History of gastroesophageal reflux.

The patient was started on Lovenox, order was written, but later on the

patient said that she has bright red blood per rectum also, so it was

discontinued.



PLAN:  To get an echo.  We will get in touch with Dr. Win, cardiologist

to assess when the patient's last stress test or cath was done.  It was

yesterday the patient said cath, but today she is saying that it was

probably a stress test.  So far no evidence of acute MI, no evidence of any

anginal symptoms, though the patient had SVT during the seizure, but _____

4 episodes of seizures last night; otherwise, the patient is fairly stable,

get echo.  Follow up lipid profile, TSH, hemoglobin A1c.  We will follow

with you.  Once the patient is stable, can be discharged home on Cardizem

30 mg 3 times a day.  When the patient is stable from Neurologic point of

view, the patient is okay to be discharged.  Follow up with Dr. Win, but

we will get in touch with Dr. Win and give the input and turn over to him

about the patient.



Thank you, Dr. Hurley, for providing us the opportunity in taking care of

the patient, Serina Mortensen.







__________________________________________

Yolis Smiley MD



DD:  03/14/2018 9:33:58

DT:  03/14/2018 10:18:32

Job # [id number]

## 2018-03-15 VITALS — RESPIRATION RATE: 20 BRPM

## 2018-03-15 LAB
ALBUMIN SERPL-MCNC: 3.7 G/DL (ref 3–4.8)
ALBUMIN/GLOB SERPL: 1.3 {RATIO} (ref 1.1–1.8)
ALT SERPL-CCNC: 23 U/L (ref 7–56)
AST SERPL-CCNC: 24 U/L (ref 14–36)
BASOPHILS # BLD AUTO: 0.02 K/MM3 (ref 0–2)
BASOPHILS NFR BLD: 0.2 % (ref 0–3)
BUN SERPL-MCNC: 15 MG/DL (ref 7–21)
CALCIUM SERPL-MCNC: 9.4 MG/DL (ref 8.4–10.5)
EOSINOPHIL # BLD: 0.2 10*3/UL (ref 0–0.7)
EOSINOPHIL NFR BLD: 2.2 % (ref 1.5–5)
ERYTHROCYTE [DISTWIDTH] IN BLOOD BY AUTOMATED COUNT: 13 % (ref 11.5–14.5)
GFR NON-AFRICAN AMERICAN: > 60
GRANULOCYTES # BLD: 5.42 10*3/UL (ref 1.4–6.5)
GRANULOCYTES NFR BLD: 61 % (ref 50–68)
HGB BLD-MCNC: 14.6 G/DL (ref 12–16)
LYMPHOCYTES # BLD: 2.7 10*3/UL (ref 1.2–3.4)
LYMPHOCYTES NFR BLD AUTO: 30.3 % (ref 22–35)
MCH RBC QN AUTO: 29.9 PG (ref 25–35)
MCHC RBC AUTO-ENTMCNC: 33.7 G/DL (ref 31–37)
MCV RBC AUTO: 88.7 FL (ref 80–105)
MONOCYTES # BLD AUTO: 0.6 10*3/UL (ref 0.1–0.6)
MONOCYTES NFR BLD: 6.3 % (ref 1–6)
PLATELET # BLD: 308 10^3/UL (ref 120–450)
PMV BLD AUTO: 9.5 FL (ref 7–11)
RBC # BLD AUTO: 4.88 10^6/UL (ref 3.5–6.1)
WBC # BLD AUTO: 8.9 10^3/UL (ref 4.5–11)

## 2018-03-15 PROCEDURE — 0DBM8ZX EXCISION OF DESCENDING COLON, VIA NATURAL OR ARTIFICIAL OPENING ENDOSCOPIC, DIAGNOSTIC: ICD-10-PCS

## 2018-03-15 PROCEDURE — 0DBN8ZX EXCISION OF SIGMOID COLON, VIA NATURAL OR ARTIFICIAL OPENING ENDOSCOPIC, DIAGNOSTIC: ICD-10-PCS

## 2018-03-15 PROCEDURE — 0DB98ZX EXCISION OF DUODENUM, VIA NATURAL OR ARTIFICIAL OPENING ENDOSCOPIC, DIAGNOSTIC: ICD-10-PCS

## 2018-03-15 PROCEDURE — 0DB68ZX EXCISION OF STOMACH, VIA NATURAL OR ARTIFICIAL OPENING ENDOSCOPIC, DIAGNOSTIC: ICD-10-PCS

## 2018-03-15 RX ADMIN — THERA TABS SCH TAB: TAB at 09:37

## 2018-03-15 RX ADMIN — PANTOPRAZOLE SODIUM SCH MG: 40 TABLET, DELAYED RELEASE ORAL at 09:37

## 2018-03-15 NOTE — CP.PCM.DIS
Provider





- Provider


Date of Admission: 


03/13/18 11:14





Attending physician: 


Brandi Abbasi MD





Primary care physician: 


Radha Abreu MD





Consults: 


Dr. Nicole: Neurology


Dr. Swain: GI


Dr. Smiley: Cardiology


Time Spent in preparation of Discharge (in minutes): 45





Hospital Course





- Lab Results


Lab Results: 


 Micro Results





03/13/18 15:03   Blood-Venous   Blood Culture - Preliminary


                            NO GROWTH AFTER 24 HOURS


03/13/18 14:00   Blood-Venous   Blood Culture - Preliminary


                            NO GROWTH AFTER 24 HOURS





 Most Recent Lab Values











WBC  8.9 10^3/ul (4.5-11.0)   03/15/18  07:00    


 


RBC  4.88 10^6/uL (3.5-6.1)   03/15/18  07:00    


 


Hgb  14.6 g/dL (12.0-16.0)   03/15/18  07:00    


 


Hct  43.3 % (36.0-48.0)   03/15/18  07:00    


 


MCV  88.7 fl (80.0-105.0)   03/15/18  07:00    


 


MCH  29.9 pg (25.0-35.0)   03/15/18  07:00    


 


MCHC  33.7 g/dl (31.0-37.0)   03/15/18  07:00    


 


RDW  13.0 % (11.5-14.5)   03/15/18  07:00    


 


Plt Count  308 10^3/uL (120.0-450.0)   03/15/18  07:00    


 


MPV  9.5 fl (7.0-11.0)   03/15/18  07:00    


 


Gran %  61.0 % (50.0-68.0)   03/15/18  07:00    


 


Lymph % (Auto)  30.3 % (22.0-35.0)   03/15/18  07:00    


 


Mono % (Auto)  6.3 % (1.0-6.0)  H  03/15/18  07:00    


 


Eos % (Auto)  2.2 % (1.5-5.0)   03/15/18  07:00    


 


Baso % (Auto)  0.2 % (0.0-3.0)   03/15/18  07:00    


 


Gran #  5.42  (1.4-6.5)   03/15/18  07:00    


 


Lymph # (Auto)  2.7  (1.2-3.4)   03/15/18  07:00    


 


Mono # (Auto)  0.6  (0.1-0.6)   03/15/18  07:00    


 


Eos # (Auto)  0.2  (0.0-0.7)   03/15/18  07:00    


 


Baso # (Auto)  0.02 K/mm3 (0.0-2.0)   03/15/18  07:00    


 


Sodium  139 mmol/L (132-148)   03/15/18  07:00    


 


Potassium  3.4 mmol/L (3.6-5.0)  L  03/15/18  07:00    


 


Chloride  109 mmol/L ()  H  03/15/18  07:00    


 


Carbon Dioxide  19 mmol/L (21-33)  L  03/15/18  07:00    


 


Anion Gap  14  (10-20)   03/15/18  07:00    


 


BUN  15 mg/dL (7-21)   03/15/18  07:00    


 


Creatinine  0.9 mg/dl (0.7-1.2)   03/15/18  07:00    


 


Est GFR ( Amer)  > 60   03/15/18  07:00    


 


Est GFR (Non-Af Amer)  > 60   03/15/18  07:00    


 


POC Glucose (mg/dL)  125 mg/dL ()  H  03/14/18  09:47    


 


Random Glucose  91 mg/dL ()   03/15/18  07:00    


 


Hemoglobin A1c  5.6 % (4.2-6.5)   03/14/18  06:45    


 


Calcium  9.4 mg/dL (8.4-10.5)   03/15/18  07:00    


 


Phosphorus  3.0 mg/dL (2.5-4.5)   03/14/18  06:45    


 


Magnesium  2.2 mg/dL (1.7-2.2)   03/14/18  06:45    


 


Total Bilirubin  0.3 mg/dL (0.2-1.3)   03/15/18  07:00    


 


AST  24 U/L (14-36)   03/15/18  07:00    


 


ALT  23 U/L (7-56)   03/15/18  07:00    


 


Alkaline Phosphatase  92 U/L ()   03/15/18  07:00    


 


Lactate Dehydrogenase  637 U/L (333-699)   03/13/18  09:30    


 


Total Creatine Kinase  146 U/L ()   03/13/18  09:30    


 


Troponin I  < 0.01 ng/mL  03/13/18  20:54    


 


C-React Prot High Sens  5.63 mg/L (1.00-3.00)  H  03/13/18  15:51    


 


Total Protein  6.6 g/dL (5.8-8.3)   03/15/18  07:00    


 


Albumin  3.7 g/dL (3.0-4.8)   03/15/18  07:00    


 


Globulin  2.9 gm/dL  03/15/18  07:00    


 


Albumin/Globulin Ratio  1.3  (1.1-1.8)   03/15/18  07:00    


 


Triglycerides  83 mg/dL ()   03/14/18  06:45    


 


Cholesterol  169 mg/dL (130-200)   03/14/18  06:45    


 


LDL Cholesterol Direct  93 mg/dL (0-129)   03/14/18  06:45    


 


HDL Cholesterol  49 mg/dL (29-60)   03/14/18  06:45    


 


Procalcitonin  < 0.05 NG/ML (0.19-0.49)  L  03/13/18  15:51    


 


Thyroxine (T4)  8.9 ug/dL (5.5-11.0)   03/14/18  06:45    


 


TSH 3rd Generation  1.63 mIU/mL (0.46-4.68)   03/14/18  06:45    


 


Prolactin  21.2 ng/mL (3.0-18.9)  H  03/14/18  10:15    


 


Urine Color  Yellow  (YELLOW)   03/14/18  10:10    


 


Urine Appearance  Clear  (CLEAR)   03/14/18  10:10    


 


Urine pH  6.0  (4.7-8.0)   03/14/18  10:10    


 


Ur Specific Gravity  >= 1.030  (1.005-1.035)   03/14/18  10:10    


 


Urine Protein  Negative mg/dL (<30 mg/dL)   03/14/18  10:10    


 


Urine Glucose (UA)  Negative mg/dL (NEGATIVE)   03/14/18  10:10    


 


Urine Ketones  >=80 mg/dL (NEGATIVE)   03/14/18  10:10    


 


Urine Blood  Negative  (NEGATIVE)   03/14/18  10:10    


 


Urine Nitrate  Negative  (NEGATIVE)   03/14/18  10:10    


 


Urine Bilirubin  Small  (NEGATIVE)  H  03/14/18  10:10    


 


Urine Urobilinogen  0.2 E.U./dL (<1 E.U./dL)   03/14/18  10:10    


 


Ur Leukocyte Esterase  Negative Holly/uL (NEGATIVE)   03/14/18  10:10    


 


Influenza Typ A,B (EIA)  Negative for flu a/b  (NEGATIVE)   03/13/18  13:43    














- Hospital Course


Hospital Course: 


HPI on presentation:


62F with PMHx of seizure disorder presented to emergency department from the 

endoscopy suite for Afib with RVR. Patient states she was scheduled for a 

colonoscopy and EGD today when she was found to be in atrial fibrillation with 

RVR on the monitor. She denied any acute complaints of chest pain, palpitations

, lightheadedness, SOB at the time. Patient does not have a history of Afib or 

any cardiac history. She reports she had a high temp of 102F the day earlier 

with associated chills and diaphoresis for which she took ASA and it resolved. 

Patient also reports a single bloody BM that occurred 2 days ago. Stool was 

loose with bright red blood and maroon stool. Her last colonoscopy was 2 years 

ago and she has a hx of colon polyps and hemorrhoids as well as severe 

symptomatic GERD. No personal hx of colon cancer or esophageal cancer. Patient 

also disclosed a history of frequent seizures and takes several seizure 

medications. She was enrolled in a blinded drug trial where she took an 

unlabled seizure medication for 6 weeks. She stopped this medication 3 weeks 

ago because it did not give her any symptomatic relief. She has on average 2 

seizures per week. Her seizures occur during sleep and are usually witnessed by 

her . She takes 325 mg of aspirin following seizures and states she is 

compliant with her seizure medications.





In terms of her A-Fib, patient was found to have CHADSVASC score of 1, so ASA 

was initiated pending clearance from GI. 





Patient had an RRT called the day after admission for a seizure.  Dr. Morales was 

consulted, he stated that since patient has 2 seizures a week, a CT Head would 

be warranted but no further work up was needed.  She was asked to follow up 

with the epileptologist, Dr. Germania Maria, as well as her own neurologist, 

Dr. Rodriguez 





Patient also had her EGD while she was here, and was deemed stable for 

discharge from their standpoint.





Discharge Exam





- Head Exam


Head Exam: ATRAUMATIC, NORMAL INSPECTION, NORMOCEPHALIC





- Eye Exam


Eye Exam: EOMI, Normal appearance, PERRL


Pupil Exam: NORMAL ACCOMODATION, PERRL





- GI/Abdominal Exam


GI & Abdominal Exam: Normal Bowel Sounds





- Neurological Exam


Neurological exam: Alert, CN II-XII Intact, Normal Gait, Oriented x3, Reflexes 

Normal





- Psychiatric Exam


Psychiatric exam: Normal Affect, Normal Mood





- Skin


Skin Exam: Dry, Intact, Normal Color, Warm





Discharge Plan





- Discharge Medications


Prescriptions: 


Aspirin 81 mg PO DAILY #60 tab.chew





- Follow Up Plan


Condition: STABLE


Disposition: HOME/ ROUTINE


Patient education suggested?: Yes


Additional Instructions: 


Please follow up with Dr. Germania Maria at [facility], the epileptologist


Please follow up with your neurologist, Dr. Rodriguez


Please make sure to take your Aspirin 81 daily, as prescribed


Referrals: 


Radha Abreu MD [Primary Care Provider] - Follow up with primary

## 2018-03-15 NOTE — CP.PCM.PN
<MarinaRenato - Last Filed: 03/15/18 13:22>





Subjective





- Date & Time of Evaluation


Date of Evaluation: 03/15/18


Time of Evaluation: 12:54





- Subjective


Subjective: 


Medicine progress note: Dr. LUIZA Abbasi


Patient seen and examined at bedside.  NO acute events overnight.  Patient is 

doing well, no complaints.  She will follow up with an epileptologist 

outpatient as well as her cardiologist outpatient.





Objective





- Vital Signs/Intake and Output


Vital Signs (last 24 hours): 


 











Temp Pulse Resp BP Pulse Ox


 


 97.8 F   56 L  19   123/59 L  97 


 


 03/15/18 05:58  03/15/18 05:58  03/15/18 05:58  03/15/18 05:58  03/15/18 05:58








Intake and Output: 


 











 03/15/18 03/15/18





 06:59 18:59


 


Intake Total 0 


 


Balance 0 














- Medications


Medications: 


 Current Medications





Acetaminophen (Tylenol 325mg Tab)  650 mg PO Q6H PRN


   PRN Reason: Fever >100.4 F


   Last Admin: 03/15/18 03:33 Dose:  650 mg


Acetaminophen (Tylenol 325mg Tab)  650 mg PO Q6H PRN


   PRN Reason: Headache


Diltiazem HCl (Cardizem)  30 mg PO Q8 [facility]


   Last Admin: 03/15/18 05:11 Dose:  Not Given


Home Med (Home Med)  1 unit PO [facility]


   Last Admin: 03/14/18 22:34 Dose:  1 unit


Home Med (Home Med)  2 unit PO DAILY [facility]


   Last Admin: 03/15/18 09:37 Dose:  2 unit


Home Med (Home Med)  3 unit PO [facility]


   Last Admin: 03/14/18 22:35 Dose:  3 unit


Potassium Chloride (Potassium Chloride 10 Meq/100 Ml)  10 meq in 100 mls @ 50 

mls/hr IVPB ONCE ONE


   Stop: 03/15/18 13:19


Lorazepam (Ativan)  2 mg IVP Q2H PRN; Protocol


   PRN Reason: Seizure activity


Multivitamins (Thera Tab)  1 tab PO DAILY [facility]


   Last Admin: 03/15/18 09:37 Dose:  1 tab


Non-Formulary Medication (Clobazam [Onfi])  10 mg PO BID [facility]


   Last Admin: 03/15/18 09:40 Dose:  Not Given


Pantoprazole Sodium (Protonix Ec Tab)  40 mg PO DAILY [facility]


   Last Admin: 03/15/18 09:37 Dose:  40 mg


Topiramate (Topamax)  50 mg PO BID [facility]


   PRN Reason: Protocol


   Last Admin: 03/15/18 09:36 Dose:  50 mg











- Labs


Labs: 


 





 03/15/18 07:00 





 03/15/18 07:00 











- Constitutional


Appears: Well





- Head Exam


Head Exam: ATRAUMATIC, NORMAL INSPECTION, NORMOCEPHALIC





- Eye Exam


Eye Exam: EOMI, Normal appearance, PERRL


Pupil Exam: NORMAL ACCOMODATION, PERRL





- ENT Exam


ENT Exam: Mucous Membranes Moist, Normal Exam





- Neck Exam


Neck Exam: Full ROM, Normal Inspection.  absent: Lymphadenopathy





- Respiratory Exam


Respiratory Exam: Clear to Ausculation Bilateral, NORMAL BREATHING PATTERN





- Cardiovascular Exam


Cardiovascular Exam: REGULAR RHYTHM, +S1, +S2.  absent: Murmur





- GI/Abdominal Exam


GI & Abdominal Exam: Soft, Normal Bowel Sounds.  absent: Tenderness





- Extremities Exam


Extremities Exam: Full ROM, Normal Capillary Refill, Normal Inspection.  absent

: Joint Swelling, Pedal Edema





- Back Exam


Back Exam: NORMAL INSPECTION





- Neurological Exam


Neurological Exam: Alert, Awake, CN II-XII Intact, Normal Gait, Oriented x3





- Psychiatric Exam


Psychiatric exam: Normal Affect, Normal Mood





- Skin


Skin Exam: Dry, Intact, Normal Color, Warm





Assessment and Plan





- Assessment and Plan (Free Text)


Assessment: 


63yo female PMHx seizure disorder presents with new-onset Afib with RVR.





Chest pain r/o ACS


- Troponin q6h: 3 normal


- f/u A1c: 5.6


- lipid panel wnl


- hold ASA for GI bleed


- Echo: 


   Trace TR; 49.6% EF 


- Heart Healthy Diet


- Cardio Dr. Smiley on board





New-onset Afib with RVR


- [id number] score 1


- HASBLED score 2


- Cardizem 30mg po q8h as per cardio reccs


- no anticoagulation in light of GI bleed


- f/u TSH, free T4


- f/u Echo


- Cardio Dr. Smiley on board


   - Patient can be started on ASA after Endoscopy tomorrow, per Dr. Smiley, as 

long as normal





GI bleed


- patient has hx of GERD and polyps and hemorrhoids in the past


- no kaushal blood noted and H&H is stable on admission


- Daily H&H


- Hold anticoagulation due to risk of worsening bleed


- GI on consult: Dr. Cleveland


   - Patient set up for endoscopy tomorrow





R calf pain


- No DVT, SCD's placed





Hx of fever


- Resolved on admission


- CXR: NAD


- f/u procalcitonin: negative


- f/u blood cultures x2, UA, urine culture, rapid flu


- Tylenol prn for fever





Hx of seizure disorder


- Restarted on home meds


   Clobazam 10mg po bid


   Vimpat 100mg po hs


   Zonisamide 200mg po amhs


- Ativan 1mg q2h prn seizures


- Seizure and fall precautions


- 60g protein in diet





<Brandi Abbasi B - Last Filed: 03/16/18 18:21>





Objective





- Vital Signs/Intake and Output


Vital Signs (last 24 hours): 


 











Temp Pulse Resp BP Pulse Ox


 


 98.4 F   65   20   119/65   93 L


 


 03/16/18 06:00  03/16/18 06:00  03/16/18 06:00  03/16/18 06:00  03/16/18 06:00








Intake and Output: 


 











 03/16/18 03/16/18





 06:59 18:59


 


Intake Total 360 


 


Output Total 400 


 


Balance -40 














- Labs


Labs: 


 





 03/16/18 06:20 





 03/16/18 06:20 











Attending/Attestation





- Attestation


I have personally seen and examined this patient.: Yes


I have fully participated in the care of the patient.: Yes


I have reviewed all pertinent clinical information, including history, physical 

exam and plan: Yes


Notes (Text): 





I have seen and examined the patient at bedside. Agree with the above note with 

the following additions/ exceptions: Briefly this is  62 year old female with 

history of uncontrolled seizures, GERD, migraine, headaches, panic attack, 

cholecystectomy, appendectomy who was scheduled for colonoscopy 1 day ago and 

in SDS was found to have new onset Afib with RVR. Patient denies any chest pain

, sob, palpitation or any other complaints. Serial trop is negative. Echo 

result pending. CHADSVASC score is 1. HASBLED is 2. Patient reports bleeding in 

stools for the past few days. Will hold anticoagulation for now. Patient is 

scheduled for scope today. Cardiology eval appreciated. Will continue cardizem 

PO. Procal negative. Blood cultures negative.  Upon discharge patient will 

follow up with Dr Abreu.


Dr Brandi Abbasi

## 2018-03-15 NOTE — CP.PCM.PN
Subjective





- Date & Time of Evaluation


Date of Evaluation: 03/15/18


Time of Evaluation: 12:40





- Subjective


Subjective: 





Ms. Mortensen was seen and examined at the bedside. She is alert, oriented in all 

spheres. She denies any headache, dizziness, lightheadedness, blurred vision, 

diplopia. She states of going for an endoscopy this afternoon. She denies any 

seizure-like activity since yesterday. She is able to follow simple commands. 

There was no untoward events overnight. 





Objective





- Vital Signs/Intake and Output


Vital Signs (last 24 hours): 


 











Temp Pulse Resp BP Pulse Ox


 


 97.8 F   56 L  19   123/59 L  97 


 


 03/15/18 05:58  03/15/18 05:58  03/15/18 05:58  03/15/18 05:58  03/15/18 05:58








Intake and Output: 


 











 03/15/18 03/15/18





 06:59 18:59


 


Intake Total 0 


 


Balance 0 














- Medications


Medications: 


 Current Medications





Acetaminophen (Tylenol 325mg Tab)  650 mg PO Q6H PRN


   PRN Reason: Fever >100.4 F


   Last Admin: 03/15/18 03:33 Dose:  650 mg


Acetaminophen (Tylenol 325mg Tab)  650 mg PO Q6H PRN


   PRN Reason: Headache


Diltiazem HCl (Cardizem)  30 mg PO Q8 [facility]


   Last Admin: 03/15/18 05:11 Dose:  Not Given


Home Med (Home Med)  1 unit PO HS [facility]


   Last Admin: 03/14/18 22:34 Dose:  1 unit


Home Med (Home Med)  2 unit PO DAILY [facility]


   Last Admin: 03/15/18 09:37 Dose:  2 unit


Home Med (Home Med)  3 unit PO HS [facility]


   Last Admin: 03/14/18 22:35 Dose:  3 unit


Potassium Chloride (Potassium Chloride 10 Meq/100 Ml)  10 meq in 100 mls @ 50 

mls/hr IVPB ONCE ONE


   Stop: 03/15/18 13:19


Lorazepam (Ativan)  2 mg IVP Q2H PRN; Protocol


   PRN Reason: Seizure activity


Multivitamins (Thera Tab)  1 tab PO DAILY [facility]


   Last Admin: 03/15/18 09:37 Dose:  1 tab


Non-Formulary Medication (Clobazam [Onfi])  10 mg PO BID [facility]


   Last Admin: 03/15/18 09:40 Dose:  Not Given


Pantoprazole Sodium (Protonix Ec Tab)  40 mg PO DAILY [facility]


   Last Admin: 03/15/18 09:37 Dose:  40 mg


Topiramate (Topamax)  50 mg PO BID [facility]


   PRN Reason: Protocol


   Last Admin: 03/15/18 09:36 Dose:  50 mg











- Labs


Labs: 


 





 03/15/18 07:00 





 03/15/18 07:00 











- Constitutional


Appears: No Acute Distress





- Head Exam


Head Exam: NORMAL INSPECTION





- Neurological Exam


Neurological Exam: Alert, Awake, Oriented x3


Neuro motor strength exam: Left Upper Extremity: 5, Right Upper Extremity: 5, 

Left Lower Extremity: 5, Right Lower Extremity: 5


Additional comments: 





She is AOX#, follows commands, Sensation is intact.





Assessment and Plan


(1) Epilepsy


Assessment & Plan: 


Case discussed with Dr. Nicole, continue all current medical regimen including 

AED. Recommended Dr. Germania Maria at [facility] to her, who is a functional 

epilepsy surgeon.  The information of the functional epilepsy surgeon was 

provided to the patient. Otherwise, we will treat her seizures with Ativan 2-4 

mg IV depending on the severity. She will follow up with Dr. Rodriguez who is 

her own neurologist.


Status: Acute

## 2018-03-15 NOTE — CP.PCM.PN
Subjective





- Date & Time of Evaluation


Date of Evaluation: 03/15/18


Time of Evaluation: 07:25





- Subjective


Subjective: 








See and examined by me and Dr. Smiley





Reason for consultation and follow up:came in for colonoscopy but found to be 

in atrial fibrillation with RVR on the monitor.he denied any acute complaints 

of chest pain, palpitations, lightheadedness, SOB at the time. Patient does not 

have a history of Afib or any cardiac history. history of seizures.





Denies chest pain,denies shortness of breath, no acute distress,lying in bed

















Objective





- Vital Signs/Intake and Output


Vital Signs (last 24 hours): 


 











Temp Pulse Resp BP Pulse Ox


 


 97.8 F   56 L  19   123/59 L  97 


 


 03/15/18 05:58  03/15/18 05:58  03/15/18 05:58  03/15/18 05:58  03/15/18 05:58








Intake and Output: 


 











 03/15/18 03/15/18





 06:59 18:59


 


Intake Total 0 


 


Balance 0 














- Medications


Medications: 


 Current Medications





Acetaminophen (Tylenol 325mg Tab)  650 mg PO Q6H PRN


   PRN Reason: Fever >100.4 F


   Last Admin: 03/15/18 03:33 Dose:  650 mg


Diltiazem HCl (Cardizem)  30 mg PO Q8 [facility]


   Last Admin: 03/15/18 05:11 Dose:  Not Given


Home Med (Home Med)  1 unit PO HS [facility]


   Last Admin: 03/14/18 22:34 Dose:  1 unit


Home Med (Home Med)  2 unit PO DAILY [facility]


   Last Admin: 03/14/18 13:49 Dose:  2 unit


Home Med (Home Med)  3 unit PO [facility]


   Last Admin: 03/14/18 22:35 Dose:  3 unit


Lorazepam (Ativan)  2 mg IVP Q2H PRN; Protocol


   PRN Reason: Seizure activity


Multivitamins (Thera Tab)  1 tab PO DAILY [facility]


   Last Admin: 03/14/18 10:34 Dose:  1 tab


Non-Formulary Medication (Clobazam [Onfi])  10 mg PO BID [facility]


   Last Admin: 03/14/18 17:57 Dose:  Not Given


Pantoprazole Sodium (Protonix Ec Tab)  40 mg PO DAILY [facility]


   Last Admin: 03/14/18 10:34 Dose:  40 mg


Topiramate (Topamax)  50 mg PO BID [facility]


   PRN Reason: Protocol


   Last Admin: 03/14/18 17:55 Dose:  50 mg











- Labs


Labs: 


 





 03/15/18 07:00 





 03/15/18 07:00 











- Constitutional


Appears: Well, No Acute Distress





- Head Exam


Head Exam: NORMAL INSPECTION





- Eye Exam


Eye Exam: Normal appearance


Pupil Exam: NORMAL ACCOMODATION





- ENT Exam


ENT Exam: Mucous Membranes Moist





- Neck Exam


Neck Exam: Normal Inspection





- Respiratory Exam


Respiratory Exam: Clear to Ausculation Bilateral, NORMAL BREATHING PATTERN





- Cardiovascular Exam


Cardiovascular Exam: Irregular Rhythm, +S1, +S2





- GI/Abdominal Exam


GI & Abdominal Exam: Soft, Normal Bowel Sounds





- Extremities Exam


Extremities Exam: Normal Capillary Refill





- Neurological Exam


Neurological Exam: Alert, Awake, Oriented x3





- Psychiatric Exam


Psychiatric exam: Normal Affect, Normal Mood


Additional comments: 





mild anxiety





- Skin


Skin Exam: Dry, Intact, Normal Color, Warm





Assessment and Plan





- Assessment and Plan (Free Text)


Assessment: 





Impression:62 year old female who came in for colonoscopy but found to be in 

atrial fibrillation with RVR on the monitor. She denied any acute complaints of 

chest pain, palpitations, lightheadedness, SOB at the time. Patient does not 

have a history of Afib or any cardiac history. history of seizures.


RRT was called yesterday due to siezure activity, on Keppra and EEG was ordered.


No further siezure activity last night








Plan: 








ECHO result-Mild aortic regurgitation,Mild mitral regurgitation,mildly impaired 

systolic function EF 45-50%, normal LV size





Dr. Smiley spoke to Dr. Win and will continue follow up and work up with him in 

[facility] ([facility]].


No further cardiac work up to be done here


Stable vital signs


Continue Cardizem po


Continue current treatment plan


Anxious to go home, she takes care of her  who has dementia


Will follow up closely








Plan and treatment discussed with Dr. Smiley

## 2018-03-16 VITALS — SYSTOLIC BLOOD PRESSURE: 119 MMHG | DIASTOLIC BLOOD PRESSURE: 65 MMHG

## 2018-03-16 VITALS — TEMPERATURE: 98.4 F | OXYGEN SATURATION: 93 % | HEART RATE: 65 BPM

## 2018-03-16 LAB
ALBUMIN SERPL-MCNC: 3.2 G/DL (ref 3–4.8)
ALBUMIN/GLOB SERPL: 1.2 {RATIO} (ref 1.1–1.8)
ALT SERPL-CCNC: 24 U/L (ref 7–56)
AST SERPL-CCNC: 21 U/L (ref 14–36)
BASOPHILS # BLD AUTO: 0.03 K/MM3 (ref 0–2)
BASOPHILS NFR BLD: 0.3 % (ref 0–3)
BUN SERPL-MCNC: 9 MG/DL (ref 7–21)
CALCIUM SERPL-MCNC: 9.1 MG/DL (ref 8.4–10.5)
EOSINOPHIL # BLD: 0.1 10*3/UL (ref 0–0.7)
EOSINOPHIL NFR BLD: 1.2 % (ref 1.5–5)
ERYTHROCYTE [DISTWIDTH] IN BLOOD BY AUTOMATED COUNT: 13 % (ref 11.5–14.5)
GFR NON-AFRICAN AMERICAN: > 60
GRANULOCYTES # BLD: 5.99 10*3/UL (ref 1.4–6.5)
GRANULOCYTES NFR BLD: 65.2 % (ref 50–68)
HGB BLD-MCNC: 13.2 G/DL (ref 12–16)
LYMPHOCYTES # BLD: 2.4 10*3/UL (ref 1.2–3.4)
LYMPHOCYTES NFR BLD AUTO: 25.9 % (ref 22–35)
MCH RBC QN AUTO: 29.3 PG (ref 25–35)
MCHC RBC AUTO-ENTMCNC: 32.8 G/DL (ref 31–37)
MCV RBC AUTO: 89.4 FL (ref 80–105)
MONOCYTES # BLD AUTO: 0.7 10*3/UL (ref 0.1–0.6)
MONOCYTES NFR BLD: 7.4 % (ref 1–6)
PLATELET # BLD: 302 10^3/UL (ref 120–450)
PMV BLD AUTO: 9.5 FL (ref 7–11)
RBC # BLD AUTO: 4.51 10^6/UL (ref 3.5–6.1)
WBC # BLD AUTO: 9.2 10^3/UL (ref 4.5–11)

## 2018-03-16 RX ADMIN — PANTOPRAZOLE SODIUM SCH MG: 40 TABLET, DELAYED RELEASE ORAL at 09:41

## 2018-03-16 RX ADMIN — THERA TABS SCH TAB: TAB at 09:42

## 2018-03-16 NOTE — CP.PCM.PN
Subjective





- Date & Time of Evaluation


Date of Evaluation: 03/16/18


Time of Evaluation: 08:00





- Subjective


Subjective: 


PGY4 GI Follow-up 





Pt seen and examined bedside 


Seizure activity overnight 


denies any rectal bleeding 


tolerating diet 





ROS: 12 point ROS conducted, neg other than above








Objective





- Vital Signs/Intake and Output


Vital Signs (last 24 hours): 


 











Temp Pulse Resp BP Pulse Ox


 


 98.4 F   65   20   119/65   93 L


 


 03/16/18 06:00  03/16/18 06:00  03/16/18 06:00  03/16/18 06:00  03/16/18 06:00








Intake and Output: 


 











 03/16/18 03/16/18





 06:59 18:59


 


Intake Total 360 


 


Output Total 400 


 


Balance -40 














- Medications


Medications: 


 Current Medications





Acetaminophen (Tylenol 325mg Tab)  650 mg PO Q6H PRN


   PRN Reason: Fever >100.4 F


   Last Admin: 03/15/18 03:33 Dose:  650 mg


Acetaminophen (Tylenol 325mg Tab)  650 mg PO Q6H PRN


   PRN Reason: Headache


Diltiazem HCl (Cardizem)  30 mg PO Q8 [facility]


   Last Admin: 03/16/18 05:38 Dose:  30 mg


Home Med (Home Med)  1 unit PO HS [facility]


   Last Admin: 03/15/18 23:33 Dose:  1 unit


Home Med (Home Med)  2 unit PO DAILY [facility]


   Last Admin: 03/16/18 09:42 Dose:  2 unit


Home Med (Home Med)  3 unit PO HS [facility]


   Last Admin: 03/15/18 23:34 Dose:  3 unit


Sodium Chloride (Sodium Chloride 0.9%)  1,000 mls @ 100 mls/hr IV .Q10H [facility]


   Last Admin: 03/16/18 05:30 Dose:  100 mls/hr


Lorazepam (Ativan)  2 mg IVP Q2H PRN; Protocol


   PRN Reason: Seizure activity


   Last Admin: 03/15/18 19:08 Dose:  2 mg


Multivitamins (Thera Tab)  1 tab PO DAILY [facility]


   Last Admin: 03/16/18 09:42 Dose:  1 tab


Non-Formulary Medication (Clobazam [Onfi])  10 mg PO BID [facility]


   Last Admin: 03/15/18 17:42 Dose:  Not Given


Pantoprazole Sodium (Protonix Ec Tab)  40 mg PO DAILY [facility]


   Last Admin: 03/16/18 09:41 Dose:  40 mg


Topiramate (Topamax)  50 mg PO BID [facility]


   PRN Reason: Protocol


   Last Admin: 03/16/18 09:41 Dose:  50 mg











- Labs


Labs: 


 





 03/16/18 06:20 





 03/16/18 06:20 











- Constitutional


Appears: Well, No Acute Distress





- Head Exam


Head Exam: ATRAUMATIC, NORMOCEPHALIC





- Eye Exam


Eye Exam: Normal appearance





- ENT Exam


ENT Exam: Mucous Membranes Moist, Normal Exam





- Neck Exam


Neck Exam: Normal Inspection





- Respiratory Exam


Respiratory Exam: Clear to Ausculation Bilateral, NORMAL BREATHING PATTERN.  

absent: Rales, Rhonchi, Wheezes, Respiratory Distress





- Cardiovascular Exam


Cardiovascular Exam: Irregular Rhythm





- GI/Abdominal Exam


GI & Abdominal Exam: Soft, Normal Bowel Sounds.  absent: Guarding, Rigid, 

Tenderness, Organomegaly





- Extremities Exam


Extremities Exam: absent: Joint Swelling, Pedal Edema





- Neurological Exam


Neurological Exam: Alert, Awake, Oriented x3





- Psychiatric Exam


Psychiatric exam: Normal Affect, Normal Mood





- Skin


Skin Exam: Dry, Intact, Normal Color, Warm





Assessment and Plan





- Assessment and Plan (Free Text)


Assessment: 


This a 62 yr old F with seizure disorder and new onset A fib with RVR when she 

presented for outpatient EGD/ colonoscopy for GI bleeding. S/P EGD and 

colonoscopy 03/15/18 revealed Grade C esophagitis and colon was filled with 

liquid stool but no obvious lesions





Grade C esophaphagitis 


New onset A-fib 


seizures


 


Plan: 





- will need to repeat colonoscopy in 6months to 1 year due to poor prep


- continue PPI BID, repeat EGD in 3months


- follow-up with Dr. Swain as an oupt 


- cleared for anticoag and antiplatlet from GI Standpoint


- okay to d/c from GI standpoint


- rest of care as per primary team, neurology and cardiology 





D/W Dr. Cleveland

## 2018-03-16 NOTE — CP.PCM.PN
Subjective





- Date & Time of Evaluation


Date of Evaluation: 03/16/18


Time of Evaluation: 07:10





- Subjective


Subjective: 








Seen and examined by me and Dr. Serrano





Reason for consult and follow up: 62 year old female who came in for 

colonoscopy but found to be in atrial fibrillation with RVR on the monitor. She 

denied any acute complaints of chest pain, palpitations, lightheadedness, SOB 

at the time. Patient does not have a history of Afib or any cardiac history. 

history of seizures.








Subjective: denies chest pain, denies shortness of breath, no episode of 

seizure last night, wanted to go home





Objective





- Vital Signs/Intake and Output


Vital Signs (last 24 hours): 


 











Temp Pulse Resp BP Pulse Ox


 


 97.7 F   62   20   119/65   94 L


 


 03/16/18 00:01  03/16/18 05:38  03/16/18 00:01  03/16/18 05:38  03/16/18 00:01











- Medications


Medications: 


 Current Medications





Acetaminophen (Tylenol 325mg Tab)  650 mg PO Q6H PRN


   PRN Reason: Fever >100.4 F


   Last Admin: 03/15/18 03:33 Dose:  650 mg


Acetaminophen (Tylenol 325mg Tab)  650 mg PO Q6H PRN


   PRN Reason: Headache


Diltiazem HCl (Cardizem)  30 mg PO Q8 [facility]


   Last Admin: 03/16/18 05:38 Dose:  30 mg


Home Med (Home Med)  1 unit PO HS [facility]


   Last Admin: 03/15/18 23:33 Dose:  1 unit


Home Med (Home Med)  2 unit PO DAILY [facility]


   Last Admin: 03/15/18 09:37 Dose:  2 unit


Home Med (Home Med)  3 unit PO HS [facility]


   Last Admin: 03/15/18 23:34 Dose:  3 unit


Sodium Chloride (Sodium Chloride 0.9%)  1,000 mls @ 100 mls/hr IV .Q10H [facility]


   Last Admin: 03/16/18 05:30 Dose:  100 mls/hr


Lorazepam (Ativan)  2 mg IVP Q2H PRN; Protocol


   PRN Reason: Seizure activity


   Last Admin: 03/15/18 19:08 Dose:  2 mg


Multivitamins (Thera Tab)  1 tab PO DAILY [facility]


   Last Admin: 03/15/18 09:37 Dose:  1 tab


Non-Formulary Medication (Clobazam [Onfi])  10 mg PO BID [facility]


   Last Admin: 03/15/18 17:42 Dose:  Not Given


Pantoprazole Sodium (Protonix Ec Tab)  40 mg PO DAILY [facility]


   Last Admin: 03/15/18 09:37 Dose:  40 mg


Topiramate (Topamax)  50 mg PO BID [facility]


   PRN Reason: Protocol


   Last Admin: 03/15/18 23:59 Dose:  Not Given











- Labs


Labs: 


 





 03/15/18 07:00 





 03/15/18 07:00 











- Constitutional


Appears: No Acute Distress





- Head Exam


Head Exam: NORMAL INSPECTION, NORMOCEPHALIC





- Eye Exam


Eye Exam: Normal appearance


Pupil Exam: NORMAL ACCOMODATION





- ENT Exam


ENT Exam: Mucous Membranes Moist, Normal Exam





- Respiratory Exam


Respiratory Exam: Clear to Ausculation Bilateral, NORMAL BREATHING PATTERN





- Cardiovascular Exam


Cardiovascular Exam: REGULAR RHYTHM, +S1, +S2





- GI/Abdominal Exam


GI & Abdominal Exam: Soft, Normal Bowel Sounds





- Extremities Exam


Extremities Exam: Normal Capillary Refill





- Neurological Exam


Neurological Exam: Alert, Awake, Oriented x3





- Psychiatric Exam


Psychiatric exam: Normal Affect, Normal Mood





- Skin


Skin Exam: Dry, Intact, Normal Color, Warm





Assessment and Plan





- Assessment and Plan (Free Text)


Assessment: 





Impression:62 year old female who came in for colonoscopy but found to be in 

atrial fibrillation with RVR on the monitor. She denied any acute complaints of 

chest pain, palpitations, lightheadedness, SOB at the time. Patient does not 

have a history of Afib or any cardiac history. history of seizures.post 

colonoscopy 3/15/18








Plan: 








Had colonoscopy yesterday- esophagitis,gastritis, 5 cm hiatal hernia


Cardiac status stable 


[place] up [place] [facility] [place]


No further cardiac work up to be done here


Stable vital signs


Continue Cardizem po


Possible discharge today











Plan and treatment discussed with Dr. Serrano

## 2018-03-16 NOTE — PN
DATE:  03/15/2018



I discussed with Dr. Win, [facility], mentioned to him,

once the patient's GI issue has been resolved, patient can be discharged

and follow with Dr. Win.  If patient has recurrent SVT, then he will

arrange EP with his partner Dr. Khoury.  Discussed with the patient in

length after endoscopy in PACU unit.  So once the GI workup is done and

patient is stable, can be discharged.  No further cardiac workup is done

from the Cardiology point of view.  Upon discharge, patient will be

followed by Dr. Win and Dr. Khoury as mentioned above.  So again, patient

would be cleared from Cardiology point of view.



Thank you, Dr. Hurley for providing us the opportunity in taking care of

patient Serina Mortensen.





__________________________________________

Yolis Smiley MD



DD:  03/15/2018 15:11:38

DT:  03/15/2018 15:58:58

Job # [id number]

## 2018-03-16 NOTE — CP.PCM.DIS
Provider





- Provider


Date of Admission: 


03/13/18 11:14





Attending physician: 


Brandi Abbasi MD





Primary care physician: 


Radha Abreu MD








Hospital Course





- Lab Results


Lab Results: 


 Micro Results





03/13/18 15:03   Blood-Venous   Blood Culture - Preliminary


                            NO GROWTH AFTER 48 HOURS


03/13/18 14:00   Blood-Venous   Blood Culture - Preliminary


                            NO GROWTH AFTER 48 HOURS





 Most Recent Lab Values











WBC  9.2 10^3/ul (4.5-11.0)   03/16/18  06:20    


 


RBC  4.51 10^6/uL (3.5-6.1)   03/16/18  06:20    


 


Hgb  13.2 g/dL (12.0-16.0)   03/16/18  06:20    


 


Hct  40.3 % (36.0-48.0)   03/16/18  06:20    


 


MCV  89.4 fl (80.0-105.0)   03/16/18  06:20    


 


MCH  29.3 pg (25.0-35.0)   03/16/18  06:20    


 


MCHC  32.8 g/dl (31.0-37.0)   03/16/18  06:20    


 


RDW  13.0 % (11.5-14.5)   03/16/18  06:20    


 


Plt Count  302 10^3/uL (120.0-450.0)   03/16/18  06:20    


 


MPV  9.5 fl (7.0-11.0)   03/16/18  06:20    


 


Gran %  65.2 % (50.0-68.0)   03/16/18  06:20    


 


Lymph % (Auto)  25.9 % (22.0-35.0)   03/16/18  06:20    


 


Mono % (Auto)  7.4 % (1.0-6.0)  H  03/16/18  06:20    


 


Eos % (Auto)  1.2 % (1.5-5.0)  L  03/16/18  06:20    


 


Baso % (Auto)  0.3 % (0.0-3.0)   03/16/18  06:20    


 


Gran #  5.99  (1.4-6.5)   03/16/18  06:20    


 


Lymph # (Auto)  2.4  (1.2-3.4)   03/16/18  06:20    


 


Mono # (Auto)  0.7  (0.1-0.6)  H  03/16/18  06:20    


 


Eos # (Auto)  0.1  (0.0-0.7)   03/16/18  06:20    


 


Baso # (Auto)  0.03 K/mm3 (0.0-2.0)   03/16/18  06:20    


 


Sodium  141 mmol/L (132-148)   03/16/18  06:20    


 


Potassium  3.9 mmol/L (3.6-5.0)   03/16/18  06:20    


 


Chloride  113 mmol/L ()  H  03/16/18  06:20    


 


Carbon Dioxide  21 mmol/L (21-33)   03/16/18  06:20    


 


Anion Gap  12  (10-20)   03/16/18  06:20    


 


BUN  9 mg/dL (7-21)   03/16/18  06:20    


 


Creatinine  0.8 mg/dl (0.7-1.2)   03/16/18  06:20    


 


Est GFR ( Amer)  > 60   03/16/18  06:20    


 


Est GFR (Non-Af Amer)  > 60   03/16/18  06:20    


 


POC Glucose (mg/dL)  125 mg/dL ()  H  03/14/18  09:47    


 


Random Glucose  100 mg/dL ()   03/16/18  06:20    


 


Hemoglobin A1c  5.6 % (4.2-6.5)   03/14/18  06:45    


 


Calcium  9.1 mg/dL (8.4-10.5)   03/16/18  06:20    


 


Phosphorus  3.0 mg/dL (2.5-4.5)   03/14/18  06:45    


 


Magnesium  2.2 mg/dL (1.7-2.2)   03/14/18  06:45    


 


Total Bilirubin  0.2 mg/dL (0.2-1.3)   03/16/18  06:20    


 


AST  21 U/L (14-36)   03/16/18  06:20    


 


ALT  24 U/L (7-56)   03/16/18  06:20    


 


Alkaline Phosphatase  77 U/L ()   03/16/18  06:20    


 


Lactate Dehydrogenase  637 U/L (333-699)   03/13/18  09:30    


 


Total Creatine Kinase  146 U/L ()   03/13/18  09:30    


 


Troponin I  < 0.01 ng/mL  03/13/18  20:54    


 


C-React Prot High Sens  5.63 mg/L (1.00-3.00)  H  03/13/18  15:51    


 


Total Protein  5.8 g/dL (5.8-8.3)   03/16/18  06:20    


 


Albumin  3.2 g/dL (3.0-4.8)   03/16/18  06:20    


 


Globulin  2.7 gm/dL  03/16/18  06:20    


 


Albumin/Globulin Ratio  1.2  (1.1-1.8)   03/16/18  06:20    


 


Triglycerides  83 mg/dL ()   03/14/18  06:45    


 


Cholesterol  169 mg/dL (130-200)   03/14/18  06:45    


 


LDL Cholesterol Direct  93 mg/dL (0-129)   03/14/18  06:45    


 


HDL Cholesterol  49 mg/dL (29-60)   03/14/18  06:45    


 


Procalcitonin  < 0.05 NG/ML (0.19-0.49)  L  03/13/18  15:51    


 


Thyroxine (T4)  8.9 ug/dL (5.5-11.0)   03/14/18  06:45    


 


TSH 3rd Generation  1.63 mIU/mL (0.46-4.68)   03/14/18  06:45    


 


Prolactin  21.2 ng/mL (3.0-18.9)  H  03/14/18  10:15    


 


Urine Color  Yellow  (YELLOW)   03/14/18  10:10    


 


Urine Appearance  Clear  (CLEAR)   03/14/18  10:10    


 


Urine pH  6.0  (4.7-8.0)   03/14/18  10:10    


 


Ur Specific Gravity  >= 1.030  (1.005-1.035)   03/14/18  10:10    


 


Urine Protein  Negative mg/dL (<30 mg/dL)   03/14/18  10:10    


 


Urine Glucose (UA)  Negative mg/dL (NEGATIVE)   03/14/18  10:10    


 


Urine Ketones  >=80 mg/dL (NEGATIVE)   03/14/18  10:10    


 


Urine Blood  Negative  (NEGATIVE)   03/14/18  10:10    


 


Urine Nitrate  Negative  (NEGATIVE)   03/14/18  10:10    


 


Urine Bilirubin  Small  (NEGATIVE)  H  03/14/18  10:10    


 


Urine Urobilinogen  0.2 E.U./dL (<1 E.U./dL)   03/14/18  10:10    


 


Ur Leukocyte Esterase  Negative Holly/uL (NEGATIVE)   03/14/18  10:10    


 


Influenza Typ A,B (EIA)  Negative for flu a/b  (NEGATIVE)   03/13/18  13:43    














Discharge Exam





- Head Exam


Head Exam: NORMAL INSPECTION





Discharge Plan





- Discharge Medications


Prescriptions: 


Aspirin 81 mg PO DAILY #60 tab.chew





- Follow Up Plan


Condition: STABLE


Disposition: HOME/ ROUTINE


Instructions:  Atrial Fibrillation, Chest Pain, Seizures


Additional Instructions: 


Please follow up with Dr. Germania Maria at [facility], the epileptologist


   Contact number: [phone]


   Address: 


      [address] [place] [address]


Please follow up with your neurologist, Dr. Rodriguez


Please make sure to take your Aspirin 81 daily, as prescribed


Referrals: 


Radha Abreu MD [Primary Care Provider] - Follow up with primary

## 2018-03-16 NOTE — CP.PCM.PN
Subjective





- Date & Time of Evaluation


Date of Evaluation: 03/16/18


Time of Evaluation: 12:24





- Subjective


Subjective: 





Ms. Mortensen was seen and examined at the bedside. She is alert, oriented in all 

spheres. She denies any headache, dizziness, lightheadedness, blurred vision, 

diplopia. She states of going for an endoscopy this afternoon. She denies any 

seizure-like activity since yesterday. She is able to follow simple commands. 

She had episode of disorientation yesterday evening with no signs of seizure 

activity noted. There was no untoward events overnight.





Objective





- Vital Signs/Intake and Output


Vital Signs (last 24 hours): 


 











Temp Pulse Resp BP Pulse Ox


 


 98.4 F   65   20   119/65   93 L


 


 03/16/18 06:00  03/16/18 06:00  03/16/18 06:00  03/16/18 06:00  03/16/18 06:00








Intake and Output: 


 











 03/16/18 03/16/18





 06:59 18:59


 


Intake Total 360 


 


Output Total 400 


 


Balance -40 














- Medications


Medications: 


 Current Medications





Acetaminophen (Tylenol 325mg Tab)  650 mg PO Q6H PRN


   PRN Reason: Fever >100.4 F


   Last Admin: 03/15/18 03:33 Dose:  650 mg


Acetaminophen (Tylenol 325mg Tab)  650 mg PO Q6H PRN


   PRN Reason: Headache


Diltiazem HCl (Cardizem)  30 mg PO Q8 [facility]


   Last Admin: 03/16/18 05:38 Dose:  30 mg


Home Med (Home Med)  1 unit PO HS [facility]


   Last Admin: 03/15/18 23:33 Dose:  1 unit


Home Med (Home Med)  2 unit PO DAILY [facility]


   Last Admin: 03/16/18 09:42 Dose:  2 unit


Home Med (Home Med)  3 unit PO HS [facility]


   Last Admin: 03/15/18 23:34 Dose:  3 unit


Sodium Chloride (Sodium Chloride 0.9%)  1,000 mls @ 100 mls/hr IV .Q10H [facility]


   Last Admin: 03/16/18 05:30 Dose:  100 mls/hr


Lorazepam (Ativan)  2 mg IVP Q2H PRN; Protocol


   PRN Reason: Seizure activity


   Last Admin: 03/15/18 19:08 Dose:  2 mg


Multivitamins (Thera Tab)  1 tab PO DAILY [facility]


   Last Admin: 03/16/18 09:42 Dose:  1 tab


Non-Formulary Medication (Clobazam [Onfi])  10 mg PO BID [facility]


   Last Admin: 03/15/18 17:42 Dose:  Not Given


Pantoprazole Sodium (Protonix Ec Tab)  40 mg PO DAILY [facility]


   Last Admin: 03/16/18 09:41 Dose:  40 mg


Topiramate (Topamax)  50 mg PO BID [facility]


   PRN Reason: Protocol


   Last Admin: 03/16/18 09:41 Dose:  50 mg











- Labs


Labs: 


 





 03/16/18 06:20 





 03/16/18 06:20 











- Constitutional


Appears: No Acute Distress





- Head Exam


Head Exam: NORMAL INSPECTION





- Neurological Exam


Neurological Exam: Alert, Awake, Oriented x3


Neuro motor strength exam: Left Upper Extremity: 5, Right Upper Extremity: 5, 

Left Lower Extremity: 5, Right Lower Extremity: 5


Additional comments: 





Neurological unchanged from previous examination.





Assessment and Plan


(1) Epilepsy


Assessment & Plan: 


Case discussed with Dr. Nicole, continue all current medical regimen including 

AED. Recommended Dr. Germania Maria at [facility] to her, who is a functional 

epilepsy surgeon.  The information of the functional epilepsy surgeon was 

provided to the patient. Otherwise, we will treat her seizures with Ativan 2-4 

mg IV depending on the severity. She will follow up with Dr. Rodriguez who is 

her own neurologist.


Status: Acute

## 2018-04-10 ENCOUNTER — HOSPITAL ENCOUNTER (OUTPATIENT)
Dept: HOSPITAL 42 - ED | Age: 63
Setting detail: OBSERVATION
LOS: 1 days | Discharge: HOME | End: 2018-04-11
Attending: INTERNAL MEDICINE | Admitting: INTERNAL MEDICINE
Payer: MEDICAID

## 2018-04-10 VITALS — BODY MASS INDEX: 25.7 KG/M2

## 2018-04-10 DIAGNOSIS — R07.9: Primary | ICD-10-CM

## 2018-04-10 DIAGNOSIS — G40.909: ICD-10-CM

## 2018-04-10 DIAGNOSIS — I48.91: ICD-10-CM

## 2018-04-10 DIAGNOSIS — F41.0: ICD-10-CM

## 2018-04-10 DIAGNOSIS — G43.909: ICD-10-CM

## 2018-04-10 DIAGNOSIS — Z79.82: ICD-10-CM

## 2018-04-10 DIAGNOSIS — K21.9: ICD-10-CM

## 2018-04-10 LAB
ALBUMIN SERPL-MCNC: 3.8 G/DL (ref 3–4.8)
ALBUMIN/GLOB SERPL: 1.3 {RATIO} (ref 1.1–1.8)
ALT SERPL-CCNC: 25 U/L (ref 7–56)
AST SERPL-CCNC: 25 U/L (ref 14–36)
BASOPHILS # BLD AUTO: 0.03 K/MM3 (ref 0–2)
BASOPHILS NFR BLD: 0.4 % (ref 0–3)
BUN SERPL-MCNC: 12 MG/DL (ref 7–21)
CALCIUM SERPL-MCNC: 9.9 MG/DL (ref 8.4–10.5)
EOSINOPHIL # BLD: 0.5 10*3/UL (ref 0–0.7)
EOSINOPHIL NFR BLD: 5.6 % (ref 1.5–5)
ERYTHROCYTE [DISTWIDTH] IN BLOOD BY AUTOMATED COUNT: 13.4 % (ref 11.5–14.5)
GFR NON-AFRICAN AMERICAN: 50
GRANULOCYTES # BLD: 4.43 10*3/UL (ref 1.4–6.5)
GRANULOCYTES NFR BLD: 54.1 % (ref 50–68)
HDLC SERPL-MCNC: 53 MG/DL (ref 29–60)
HGB BLD-MCNC: 13.3 G/DL (ref 12–16)
LDLC SERPL-MCNC: 101 MG/DL (ref 0–129)
LYMPHOCYTES # BLD: 2.8 10*3/UL (ref 1.2–3.4)
LYMPHOCYTES NFR BLD AUTO: 34.6 % (ref 22–35)
MCH RBC QN AUTO: 29.7 PG (ref 25–35)
MCHC RBC AUTO-ENTMCNC: 33.3 G/DL (ref 31–37)
MCV RBC AUTO: 89.1 FL (ref 80–105)
MONOCYTES # BLD AUTO: 0.4 10*3/UL (ref 0.1–0.6)
MONOCYTES NFR BLD: 5.3 % (ref 1–6)
PLATELET # BLD: 343 10^3/UL (ref 120–450)
PMV BLD AUTO: 9 FL (ref 7–11)
RBC # BLD AUTO: 4.48 10^6/UL (ref 3.5–6.1)
TROPONIN I SERPL-MCNC: < 0.01 NG/ML
WBC # BLD AUTO: 8.2 10^3/UL (ref 4.5–11)

## 2018-04-10 PROCEDURE — 83036 HEMOGLOBIN GLYCOSYLATED A1C: CPT

## 2018-04-10 PROCEDURE — 82554 CREATINE ISOFORMS: CPT

## 2018-04-10 PROCEDURE — 83735 ASSAY OF MAGNESIUM: CPT

## 2018-04-10 PROCEDURE — 80061 LIPID PANEL: CPT

## 2018-04-10 PROCEDURE — 84443 ASSAY THYROID STIM HORMONE: CPT

## 2018-04-10 PROCEDURE — 80053 COMPREHEN METABOLIC PANEL: CPT

## 2018-04-10 PROCEDURE — 36415 COLL VENOUS BLD VENIPUNCTURE: CPT

## 2018-04-10 PROCEDURE — 84484 ASSAY OF TROPONIN QUANT: CPT

## 2018-04-10 PROCEDURE — 84100 ASSAY OF PHOSPHORUS: CPT

## 2018-04-10 PROCEDURE — 85025 COMPLETE CBC W/AUTO DIFF WBC: CPT

## 2018-04-10 PROCEDURE — 99285 EMERGENCY DEPT VISIT HI MDM: CPT

## 2018-04-10 PROCEDURE — 71045 X-RAY EXAM CHEST 1 VIEW: CPT

## 2018-04-10 PROCEDURE — 93005 ELECTROCARDIOGRAM TRACING: CPT

## 2018-04-10 NOTE — CP.PCM.HP
History of Present Illness





- History of Present Illness


History of Present Illness: 





Yoana Schultz, PGY1, H&P for Dr Becerra:





CC: exertional cp





63 year old female with a past medical history of epilepsy, presents for 

extertional cp for past 1.5 weeks. Pt describes the pain as left sided, pinching

, tightening, radiating to left arm, occurs while she is walking down the 

block. Relieved partially by rest. Denies n/v, epigastric pain, diaphoresis, 

syncope, dizziness, LOC. Pt was scheduled for stress test last week by a doctor 

at [facility] [place], however it was canceled due to insurance issues. Pt states that she 

would like to get it done here at this admission. Otherwise, denies f/c, 

headache, neck pain, trauma to the chest, abdominal pain, cough, diarrhea, 

constipation, leg swelling, urinary symptoms.





12 point ROS obtained and negative, except as per HPI.





PMD: Dr. Abreu 


GI: Dr. Swain


Neurologist: Dr. Avila





PMHx: seizures (dx at age 19), afib with RVR (one time episode after colonoscopy

), GERD, migraine headaches, prior hx of panic attacks, heart murmur when she 

was a child 


PSurgHx: Neck surgery (unclear which kind), , cholecystectomy, 

appendectomy


Meds: pls see chart 


ALL: NKDA


SocHx: denies Tobacco, etoh, recreational drug use.  Live at home with  (

86 yo), who she takes care of full time. States she needs to get back home to 

care for her .  stays active and walks 15 blocks per day


FamHx: Father, throat CA. 


Mother,  at age 50 from MI. CKD


Brothers, sisters, nephews all have heart disease (unspecified). 





Present on Admission





- Present on Admission


Any Indicators Present on Admission: No


History of DVT/PE: No


History of Uncontrolled Diabetes: No


Urinary Catheter: No


Decubitus Ulcer Present: No





Review of Systems





- Review of Systems


All systems: reviewed and no additional remarkable complaints except


Review of Systems: 





as per HPI





Past Patient History





- Infectious Disease


Hx of Infectious Diseases: None





- Tetanus Immunizations


Tetanus Immunization: Unknown





- Past Social History


Smoking Status: Never Smoked





- CARDIAC


Hx Cardiac Disorders: Yes


Hx Atrial Fibrillation: Yes


Hx Heart Murmur: Yes





- PULMONARY


Hx Respiratory Disorders: No





- NEUROLOGICAL


Hx Neurological Disorder: Yes


Hx Seizures: Yes





- HEENT


Hx HEENT Problems: No





- RENAL


Hx Chronic Kidney Disease: No





- ENDOCRINE/METABOLIC


Hx Endocrine Disorders: No





- HEMATOLOGICAL/ONCOLOGICAL


Hx Blood Disorders: No





- INTEGUMENTARY


Hx Dermatological Problems: No





- MUSCULOSKELETAL/RHEUMATOLOGICAL


Hx Musculoskeletal Disorders: Yes


Hx Back Pain: Yes


Hx Falls: Yes





- GASTROINTESTINAL


Hx Gastrointestinal Disorders: Yes


Hx Gall Bladder Disease: Yes


Hx Gastroesophageal Reflux: Yes





- GENITOURINARY/GYNECOLOGICAL


Hx Genitourinary Disorders: Yes





- PSYCHIATRIC


Hx Psychophysiologic Disorder: Yes


Hx Panic Symptoms: Yes


Hx Substance Use: No





- SURGICAL HISTORY


Hx  Section: Yes


Other/Comment: NASAL





- ANESTHESIA


Hx Anesthesia Reactions: No


Hx Malignant Hyperthermia: No





Meds


Allergies/Adverse Reactions: 


 Allergies











Allergy/AdvReac Type Severity Reaction Status Date / Time


 


No Known Allergies Allergy   Verified 04/10/18 18:41














Physical Exam





- Constitutional


Appears: Non-toxic, No Acute Distress





- Head Exam


Head Exam: ATRAUMATIC, NORMOCEPHALIC





- Eye Exam


Eye Exam: EOMI, PERRL.  absent: Conjunctival injection, Nystagmus, Scleral 

icterus


Pupil Exam: NORMAL ACCOMODATION, PERRL.  absent: Fixed, Irregular, Unequal





- ENT Exam


ENT Exam: Mucous Membranes Moist





- Neck Exam


Neck exam: Positive for: Full Rom





- Respiratory Exam


Respiratory Exam: Chest Wall Tenderness (TTP on left side of chest and left arm)

, Clear to Auscultation Bilateral, NORMAL BREATHING PATTERN.  absent: Accessory 

Muscle Use, Rhonchi, Wheezes, Respiratory Distress





- Cardiovascular Exam


Cardiovascular Exam: RRR, +S1, +S2.  absent: Systolic Murmur





- GI/Abdominal Exam


GI & Abdominal Exam: Normal Bowel Sounds, Soft.  absent: Distended, Guarding, 

Mass, Organomegaly, Rebound, Rigid, Tenderness





- Extremities Exam


Extremities exam: Positive for: normal inspection.  Negative for: calf 

tenderness, pedal edema





- Back Exam


Back exam: NORMAL INSPECTION





- Neurological Exam


Neurological exam: Alert, Oriented x3





- Psychiatric Exam


Psychiatric exam: Normal Affect, Normal Mood





- Skin


Skin Exam: Dry, Normal Color, Warm





Results





- Vital Signs


Recent Vital Signs: 





 Last Vital Signs











Temp  97.6 F   04/10/18 18:53


 


Pulse  48 L  04/10/18 21:54


 


Resp  18   04/10/18 21:54


 


BP  138/69   04/10/18 21:54


 


Pulse Ox  100   04/10/18 21:54














- Labs


Result Diagrams: 


 04/10/18 19:45





 04/10/18 19:45





Assessment & Plan





- Assessment and Plan (Free Text)


Assessment: 





63 year old female with PMH epilepsy, afib with RVR, presents with reproducible 

cp:





 Chest pain r/o ACS


- trops neg x2. serial trops and ekg in AM.


- EKG pend official read


- f/u A1c, lipid panel, tsh


-  given in ED. c/w home ASA 81 mg. 


- start lipitor 10 mg 


- metoprolol 25 mg - holding parameters given


- Heart Healthy Diet


- Cardio Dr. Bess on board





 Hx of seizure disorder


- Restarted on home meds


   Clobazam 10mg po bid


   Vimpat 100mg po hs


   Zonisamide 200mg po amhs


- Seizure and fall precautions


- 60g protein in diet





GI ppx: Protonix


DVT ppx: SCDs 


Diet: HHD


Precautions: Fall/Seizure





Discussed with Dr. Becerra





- Date & Time


Date: 18


Time: 03:49

## 2018-04-10 NOTE — ED PDOC
Arrival/HPI





- General


Chief Complaint: Chest Pain


Time Seen by Provider: 04/10/18 18:23


Historian: Patient





- History of Present Illness


Narrative History of Present Illness (Text): 





Patient is a 63 year old female with a past medical history of epilepsy 

presenting to the emergency room with a chief complaint of chest pain. The pain 

start initially as intermittent sharp pains approximately a week and a half 

ago. Since the onset of the pain, it has progressed from sharp to a gripping, 

squeezing pressure. The pain is made worse with any kind of exercise. She used 

to be able to walk 15-16 blocks with no problems but over the past week has not 

been able to walk one block without becoming short of breath and experiencing 

the chest pain. The pain resolves shortly after sits down and rests. She was 

recently admitted when she was found to be in afib RVR prior to an EGD/

Colonoscopy in March. An ECHO at that time showed an EF ~ 45-50%. She was 

supposed to have a stress test done as an outpatient approximately two weeks 

ago but her insurance declined the procedure so it was never done. Patient is 

currently asymptomatic but had an episode that resulted in pain that radiated 

to her left shoulder and down her left arm. This was a new symptom that scared 

the patient and caused her to come to the emergency room today. She did not 

come to the hospital sooner because she has her , who suffers from 

dementia, at home and she is his primary care giver. Denies fevers, chills, 

nausea, vomiting, diarrhea, constipation, blurry vision, sore throat, cough, 

palpitations, abdominal pain, headaches, diaphoresis, lightheadedness or 

dizziness. 





PMD: Lois


PMH: epilepsy, one episode of afib w/ RVR in 2018.


Family: Mother  of MI at 50 years old, 3 brothers and 1 sister had MIs, 

Father  of lung cancer


Social: denies tobacco, alcohol or drug use


Allergies: NKDA





Past Medical History





- Provider Review


Nursing Documentation Reviewed: Yes





- Past History


Past History: Non-Contributing





- Infectious Disease


Hx of Infectious Diseases: None





- Tetanus Immunization


Tetanus Immunization: Unknown





- Cardiac


Hx Cardiac Disorders: Yes


Hx Atrial Fibrillation: Yes


Hx Heart Murmur: Yes





- Pulmonary


Hx Respiratory Disorders: No





- Neurological


Hx Neurological Disorder: Yes


Hx Seizures: Yes





- HEENT


Hx HEENT Disorder: No





- Renal


Hx Renal Disorder: No





- Endocrine/Metabolic


Hx Endocrine Disorders: No





- Hematological/Oncological


Hx Blood Disorders: No





- Integumentary


Hx Dermatological Disorder: No





- Musculoskeletal/Rheumatological


Hx Musculoskeletal Disorders: Yes


Hx Back Pain: Yes


Hx Falls: Yes





- Gastrointestinal


Hx Gastrointestinal Disorders: Yes


Hx Gall Bladder Disease: Yes


Hx Gastroesophageal Reflux: Yes





- Genitourinary/Gynecological


Hx Genitourinary Disorders: Yes





- Psychiatric


Hx Psychophysiologic Disorder: Yes


Hx Panic Disorder: Yes


Hx Substance Use: No





- Surgical History


Hx  Section: Yes


Other/Comment: NASAL





- Anesthesia


Hx Anesthesia Reactions: No


Hx Malignant Hyperthermia: No





- Suicidal Assessment


Feels Threatened In Home Enviroment: No





Family/Social History





- Physician Review


Nursing Documentation Reviewed: Yes


Family/Social History: Neoplasm/Cancer


Narrative Family History (Free Text): 





 Mother  of MI at 50 years old, 3 brothers and 1 sister had MIs, Father 

 of lung cancer


Smoking Status: Never Smoked


Hx Alcohol Use: No


Hx Substance Use: No


Hx Substance Use Treatment: No





Allergies/Home Meds


Allergies/Adverse Reactions: 


Allergies





No Known Allergies Allergy (Verified 04/10/18 18:41)


 








Home Medications: 


 Home Meds











 Medication  Instructions  Recorded  Confirmed


 


RX: Topiramate [Topamax] 50 mg PO BID 03/08/18 04/10/18


 


RX: Zonisamide [Zonegran] 200 mg PO AMHS 03/08/18 04/10/18


 


RX: Clobazam [Onfi] 10 mg PO BID 03/13/18 04/10/18


 


RX: Lacosamide [Vimpat] 100 mg PO HS 03/13/18 04/10/18














Review of Systems





- Review of Systems


Constitutional: Fatigue (short of breath after one walking one block).  absent: 

Fevers


Eyes: Normal.  absent: Vision Changes


ENT: Normal.  absent: Sore Throat


Respiratory: SOB.  absent: Cough, Wheezing


Cardiovascular: Chest Pain (griping/squeezing on left side of chest), THOMPSON (

everytime she attempts to walk or do housework ).  absent: Palpitations, Edema, 

Calf Pain, Orthopnea, Syncope


Gastrointestinal: absent: Abdominal Pain


Genitourinary Female: absent: Dysuria, Frequency


Musculoskeletal: Normal.  absent: Back Pain, Neck Pain


Skin: Normal


Neurological: Normal, Seizure (baseline history of seizures, ).  absent: 

Headache, Dizziness


Endocrine: Normal.  absent: Diaphoresis


Hemo/Lymphatic: Normal.  absent: Easy Bleeding, Easy Bruising


Psychiatric: Normal.  absent: Depression





Physical Exam


Vital Signs Reviewed: Yes


Vital Signs











  Temp Pulse Resp BP Pulse Ox


 


 04/10/18 18:53  97.6 F  55 L  16  126/58 L  100











Temperature: Afebrile


Blood Pressure: Hypotensive


Pulse: Bradycardic (HR 64 on monitor during time of exam)


Respiratory Rate: Normal


Appearance: Positive for: Well-Appearing, Non-Toxic, Comfortable


Pain Distress: None


Mental Status: Positive for: Alert and Oriented X 3





- Systems Exam


Head: Present: Atraumatic, Normocephalic


Pupils: Present: PERRL


Extroacular Muscles: Present: EOMI


Conjunctiva: Present: Normal


Mouth: Present: Moist Mucous Membranes


Nose (External): Present: Atraumatic


Neck: Present: Normal Range of Motion, Trachea Midline.  No: JVD


Respiratory/Chest: Present: Clear to Auscultation, Good Air Exchange.  No: 

Respiratory Distress, Accessory Muscle Use, Wheezes


Cardiovascular: Present: Regular Rate and Rhythm, Normal S1, S2, Other (TTP on 

left side of chest - pain on palpation is different then gripping/squeezing she 

experiences on exertion)


Abdomen: Present: Normal Bowel Sounds.  No: Tenderness, Distention, Peritoneal 

Signs, Rebound, Guarding


Upper Extremity: Present: Normal Inspection, NORMAL PULSES.  No: Edema


Lower Extremity: Present: Normal Inspection, NORMAL PULSES.  No: Edema, CALF 

TENDERNESS


Neurological: Present: GCS=15, CN II-XII Intact, Speech Normal, Motor Func 

Grossly Intact


Skin: Present: Warm, Dry, Normal Color


Lymphatic: No: Cervical Adenopathy


Psychiatric: Present: Alert, Oriented x 3, Normal Insight, Normal Concentration





Medical Decision Making


Re-evaluation Time: 20:36


Reassessment Condition: Re-examined, Unchanged





- Lab Interpretations


Narrative Lab Interpretation (Text): 





No leukocytosis, Trop negative x 1





Lab Results: 








 04/10/18 19:45 





 04/10/18 19:45 





 Lab Results





04/10/18 19:45: Sodium 143, Potassium 4.3, Chloride 109 H, Carbon Dioxide 26, 

Anion Gap 12, BUN 12, Creatinine 1.1, Est GFR (African Amer) > 60, Est GFR (Non-

Af Amer) 50, Random Glucose 102, Calcium 9.9, Phosphorus 3.2, Magnesium 2.2, 

Total Bilirubin 0.1 L, AST 25, ALT 25, Alkaline Phosphatase 94, Total Creatine 

Kinase 48, Troponin I < 0.01, Total Protein 6.8, Albumin 3.8, Globulin 3.0, 

Albumin/Globulin Ratio 1.3


04/10/18 19:45: WBC 8.2, RBC 4.48, Hgb 13.3, Hct 39.9, MCV 89.1, MCH 29.7, MCHC 

33.3, RDW 13.4, Plt Count 343, MPV 9.0, Gran % 54.1, Lymph % (Auto) 34.6, Mono 

% (Auto) 5.3, Eos % (Auto) 5.6 H, Baso % (Auto) 0.4, Gran # 4.43, Lymph # (Auto

) 2.8, Mono # (Auto) 0.4, Eos # (Auto) 0.5, Baso # (Auto) 0.03








I have reviewed the lab results: Yes





- RAD Interpretation


Narrative RAD Interpretations (Text): 





No active disease 


Radiology Orders: 








04/10/18 19:21


CXR [CHEST PORTABLE] [RAD] Stat 











: ED Physician





- EKG Interpretation


EKG Interpretation (Text): 





NSR, non-specific non-specific S-T wave changes, poor R wave progression - 

abnormal EKG


Interpreted by ED Physician: Yes


Type: 12 lead EKG


Comparison: Similar to previous EKG





- Medication Orders


Current Medication Orders: 











Discontinued Medications





Aspirin (Aspirin)  325 mg PO STAT STA


   Stop: 04/10/18 19:21


   Last Admin: 04/10/18 20:30  Dose: 325 mg











Disposition/Present on Arrival





- Present on Arrival


Any Indicators Present on Arrival: No


History of DVT/PE: No


History of Uncontrolled Diabetes: No


Urinary Catheter: No


History of Decub. Ulcer: No


History Surgical Site Infection Following: None





- Disposition


Have Diagnosis and Disposition been Completed?: Yes


Diagnosis: 


 Chest pain





Disposition: HOSPITALIZED


Disposition Time: 20:48


Patient Plan: Admission, Telemetry


Patient Problems: 


 Current Active Problems











Problem Status Onset


 


Chest pain Acute  











Condition: FAIR

## 2018-04-11 VITALS — TEMPERATURE: 97.6 F | RESPIRATION RATE: 16 BRPM | SYSTOLIC BLOOD PRESSURE: 127 MMHG | DIASTOLIC BLOOD PRESSURE: 56 MMHG

## 2018-04-11 VITALS — OXYGEN SATURATION: 98 %

## 2018-04-11 VITALS — HEART RATE: 71 BPM

## 2018-04-11 LAB
ALBUMIN SERPL-MCNC: 3.3 G/DL (ref 3–4.8)
ALBUMIN/GLOB SERPL: 1.2 {RATIO} (ref 1.1–1.8)
ALT SERPL-CCNC: 25 U/L (ref 7–56)
AST SERPL-CCNC: 21 U/L (ref 14–36)
BASOPHILS # BLD AUTO: 0.04 K/MM3 (ref 0–2)
BASOPHILS NFR BLD: 0.5 % (ref 0–3)
BUN SERPL-MCNC: 12 MG/DL (ref 7–21)
CALCIUM SERPL-MCNC: 9.3 MG/DL (ref 8.4–10.5)
EOSINOPHIL # BLD: 0.5 10*3/UL (ref 0–0.7)
EOSINOPHIL NFR BLD: 7 % (ref 1.5–5)
ERYTHROCYTE [DISTWIDTH] IN BLOOD BY AUTOMATED COUNT: 13.4 % (ref 11.5–14.5)
GFR NON-AFRICAN AMERICAN: 56
GRANULOCYTES # BLD: 3.52 10*3/UL (ref 1.4–6.5)
GRANULOCYTES NFR BLD: 47.2 % (ref 50–68)
HGB BLD-MCNC: 12.9 G/DL (ref 12–16)
LYMPHOCYTES # BLD: 2.9 10*3/UL (ref 1.2–3.4)
LYMPHOCYTES NFR BLD AUTO: 39.5 % (ref 22–35)
MCH RBC QN AUTO: 29.1 PG (ref 25–35)
MCHC RBC AUTO-ENTMCNC: 32.7 G/DL (ref 31–37)
MCV RBC AUTO: 88.7 FL (ref 80–105)
MONOCYTES # BLD AUTO: 0.4 10*3/UL (ref 0.1–0.6)
MONOCYTES NFR BLD: 5.8 % (ref 1–6)
PLATELET # BLD: 336 10^3/UL (ref 120–450)
PMV BLD AUTO: 9.4 FL (ref 7–11)
RBC # BLD AUTO: 4.44 10^6/UL (ref 3.5–6.1)
WBC # BLD AUTO: 7.5 10^3/UL (ref 4.5–11)

## 2018-04-11 NOTE — CARD
--------------- APPROVED REPORT --------------





EKG Measurement

Heart [id number]

KY 170P41

[id number]

[id number]

[id number]



<Conclusion>

Sinus bradycardia

Left axis deviation

PRWP

NSSTW changes

AF no longer present

## 2018-04-11 NOTE — RAD
HISTORY:

Chest pain



COMPARISON:

03/13/2016 



FINDINGS:



LUNGS:

No active pulmonary disease.



PLEURA:

No significant pleural effusion identified, no pneumothorax apparent.



CARDIOVASCULAR:

 No radiographic findings to suggest acute or significant 

cardiovascular disease.



OSSEOUS STRUCTURES:

No significant abnormalities.



VISUALIZED UPPER ABDOMEN:

Normal.



OTHER FINDINGS:

None.



IMPRESSION:

No active disease. No significant interval change compared to the 

prior examination(s).

## 2018-04-11 NOTE — CON
DATE:  04/11/2018



CARDIOLOGY CONSULTATION



HISTORY OF PRESENT ILLNESS:  The patient is a 63-year-old woman who

presents with 2 months of intermittent chest pain.  She has been seen her

cardiologist in [place], who has done various test and has told her

heart is okay.  Her symptoms have progressed.  Currently, the patient is

chest pain free.



PAST MEDICAL HISTORY:  Notable for questionable COPD.



No previous cardiac history is noted.  No cardiac risk factors.



SOCIAL HISTORY:  She denies smoking.



REVIEW OF SYSTEMS:  A 14-point review of systems was reviewed in detail. 

No cardiac symptomatology is noted.  The patient is able to ambulate

without symptoms.



PHYSICAL EXAMINATION

VITAL SIGNS:  Blood pressure is 127/56, the heart rate is in the 50s at

rest.

NECK:  Negative JVD.

LUNGS:  Without rales.

HEART:  Reveals S1, S2.

EXTREMITIES:  Without edema.



LABORATORY DATA:  Hemoglobin is 12.9.  Chemistries, troponins are negative

x3.



IMPRESSION:

1.  Chronic chest pain.

2.  No evidence for acute coronary syndrome.

3.  No previous cardiac history.  There is question of

hypercholesterolemia.



PLAN:  Given these findings, there is no evidence for acute coronary

syndrome.  The patient is agreeable for a stress test.  We will arrange for

an outpatient stress Cardiolite later this week.  From a cardiac

perspective, the patient can be discharged.





__________________________________________

Abelardo Bess MD



DD:  04/11/2018 13:32:15

DT:  04/11/2018 13:36:11

Job # [id number]

## 2018-04-11 NOTE — CARD
--------------- APPROVED REPORT --------------





EKG Measurement

Heart [id number]

KY 220P51

[id number]

[id number]

[id number]



<Conclusion>

Sinus bradycardia with 1st degree AV block

Nonspecific ST and T wave abnormality

Abnormal ECG

## 2018-04-12 NOTE — CP.PCM.DIS
<Reese Lockett - Last Filed: 04/12/18 19:42>





Provider





- Provider


Date of Admission: 


04/10/18 20:41





Attending physician: 


Bert Leon MD





Primary care physician: 


Radha Abreu MD





Consults: 





Cardio: Dr. Abelardo Bess


Time Spent in preparation of Discharge (in minutes): 35





Diagnosis





- Discharge Diagnosis


(1) Chest pain


Status: Acute   





Hospital Course





- Lab Results


Lab Results: 


 Most Recent Lab Values











WBC  7.5 10^3/ul (4.5-11.0)   04/11/18  06:00    


 


RBC  4.44 10^6/uL (3.5-6.1)   04/11/18  06:00    


 


Hgb  12.9 g/dL (12.0-16.0)   04/11/18  06:00    


 


Hct  39.4 % (36.0-48.0)   04/11/18  06:00    


 


MCV  88.7 fl (80.0-105.0)   04/11/18  06:00    


 


MCH  29.1 pg (25.0-35.0)   04/11/18  06:00    


 


MCHC  32.7 g/dl (31.0-37.0)   04/11/18  06:00    


 


RDW  13.4 % (11.5-14.5)   04/11/18  06:00    


 


Plt Count  336 10^3/uL (120.0-450.0)   04/11/18  06:00    


 


MPV  9.4 fl (7.0-11.0)   04/11/18  06:00    


 


Gran %  47.2 % (50.0-68.0)  L  04/11/18  06:00    


 


Lymph % (Auto)  39.5 % (22.0-35.0)  H  04/11/18  06:00    


 


Mono % (Auto)  5.8 % (1.0-6.0)   04/11/18  06:00    


 


Eos % (Auto)  7.0 % (1.5-5.0)  H  04/11/18  06:00    


 


Baso % (Auto)  0.5 % (0.0-3.0)   04/11/18  06:00    


 


Gran #  3.52  (1.4-6.5)   04/11/18  06:00    


 


Lymph # (Auto)  2.9  (1.2-3.4)   04/11/18  06:00    


 


Mono # (Auto)  0.4  (0.1-0.6)   04/11/18  06:00    


 


Eos # (Auto)  0.5  (0.0-0.7)   04/11/18  06:00    


 


Baso # (Auto)  0.04 K/mm3 (0.0-2.0)   04/11/18  06:00    


 


Sodium  143 mmol/L (132-148)   04/11/18  06:00    


 


Potassium  3.6 mmol/L (3.6-5.0)   04/11/18  06:00    


 


Chloride  111 mmol/L ()  H  04/11/18  06:00    


 


Carbon Dioxide  23 mmol/L (21-33)   04/11/18  06:00    


 


Anion Gap  12  (10-20)   04/11/18  06:00    


 


BUN  12 mg/dL (7-21)   04/11/18  06:00    


 


Creatinine  1.0 mg/dl (0.7-1.2)   04/11/18  06:00    


 


Est GFR ( Amer)  > 60   04/11/18  06:00    


 


Est GFR (Non-Af Amer)  56   04/11/18  06:00    


 


Random Glucose  87 mg/dL ()   04/11/18  06:00    


 


Hemoglobin A1c  5.7 % (4.2-6.5)   04/10/18  19:45    


 


Calcium  9.3 mg/dL (8.4-10.5)   04/11/18  06:00    


 


Phosphorus  3.4 mg/dL (2.5-4.5)   04/11/18  06:00    


 


Magnesium  2.2 mg/dL (1.7-2.2)   04/11/18  06:00    


 


Total Bilirubin  0.3 mg/dL (0.2-1.3)   04/11/18  06:00    


 


AST  21 U/L (14-36)   04/11/18  06:00    


 


ALT  25 U/L (7-56)   04/11/18  06:00    


 


Alkaline Phosphatase  86 U/L ()   04/11/18  06:00    


 


Total Creatine Kinase  48 U/L ()   04/10/18  19:45    


 


Troponin I  < 0.01 ng/mL  04/11/18  06:00    


 


Total Protein  6.1 g/dL (5.8-8.3)   04/11/18  06:00    


 


Albumin  3.3 g/dL (3.0-4.8)   04/11/18  06:00    


 


Globulin  2.8 gm/dL  04/11/18  06:00    


 


Albumin/Globulin Ratio  1.2  (1.1-1.8)   04/11/18  06:00    


 


Triglycerides  97 mg/dL ()   04/10/18  19:45    


 


Cholesterol  191 mg/dL (130-200)   04/10/18  19:45    


 


LDL Cholesterol Direct  101 mg/dL (0-129)   04/10/18  19:45    


 


HDL Cholesterol  53 mg/dL (29-60)   04/10/18  19:45    


 


TSH 3rd Generation  2.07 mIU/mL (0.46-4.68)   04/10/18  19:45    














- Hospital Course


Hospital Course: 





Patient is a 63 year old female with past medical history of seizures, single 

episode of afib with RVR in 3/2018, GERD, migraine headaches, prior hx of panic 

attacks, heart murmurs who presented to [facility] [place] ED complaining of a 10 day history 

of chest pain. Patient was evaluated in ED and found to have EKG showing sinus 

bradycardia and nonspecific ST segment elevations or depression, initially 

negative troponin and admitted for ACS rule out. Patient serial troponins were 

negative x3 and patient did not have worsening of her presenting symptoms. 

Cardiology was consulted and evaluated the patient to have chronic chest pain 

and hyperlipidemia. Patient was scheduled for outpatient stress test and to 

follow up with cardiology outpatient. Plan for discharge and outpatient follow 

up was discussed with the patient and she was agreeable with plan for 

outpatient follow up and stress test. At time of discharge patient was 

hemodynamically stable. Medications and discharge instructions were explained 

to patient and all questions were answered. 





- Date & Time of H&P


Date of H&P: 04/11/18


Time of H&P: 03:50





Discharge Exam





- Head Exam


Head Exam: ATRAUMATIC, NORMOCEPHALIC





- Eye Exam


Eye Exam: PERRL





- Respiratory Exam


Respiratory Exam: Clear to PA & Lateral, NORMAL BREATHING PATTERN.  absent: 

Rales, Rhonchi





- Cardiovascular Exam


Cardiovascular Exam: Bradycardia, +S1, +S2





- GI/Abdominal Exam


GI & Abdominal Exam: Normal Bowel Sounds, Soft.  absent: Firm, Tenderness





- Extremities Exam


Extremities exam: full ROM, normal inspection





- Back Exam


Back exam: absent: paraspinal tenderness





- Neurological Exam


Neurological exam: Alert, Normal Gait, Oriented x3





- Psychiatric Exam


Psychiatric exam: Normal Affect, Normal Mood





- Skin


Skin Exam: Dry, Warm





Discharge Plan





- Follow Up Plan


Condition: FAIR


Disposition: HOME/ ROUTINE


Instructions:  Heart Healthy Diet, Chest Pain (DC), Chest Pain (GEN)


Additional Instructions: 


- Follow up with primary care physician within one to two weeks upon discharge


- Follow up with nuclear stress test outpatient


- Take medications as prescribed to you


- Continue current diet restrictions and abide by heart healthy low salt diet


- Return to nearest ED if you begin experiencing worsening of chest pain, 

shortness of breath with exertion


Referrals: 


Radha Abreu MD [Primary Care Provider] - 





<Bert Leon - Last Filed: 04/13/18 08:15>





Provider





- Provider


Date of Admission: 


04/10/18 20:41





Attending physician: 


Bert Leon MD





Primary care physician: 


Radha Abreu MD








Hospital Course





- Lab Results


Lab Results: 


 Most Recent Lab Values











WBC  7.5 10^3/ul (4.5-11.0)   04/11/18  06:00    


 


RBC  4.44 10^6/uL (3.5-6.1)   04/11/18  06:00    


 


Hgb  12.9 g/dL (12.0-16.0)   04/11/18  06:00    


 


Hct  39.4 % (36.0-48.0)   04/11/18  06:00    


 


MCV  88.7 fl (80.0-105.0)   04/11/18  06:00    


 


MCH  29.1 pg (25.0-35.0)   04/11/18  06:00    


 


MCHC  32.7 g/dl (31.0-37.0)   04/11/18  06:00    


 


RDW  13.4 % (11.5-14.5)   04/11/18  06:00    


 


Plt Count  336 10^3/uL (120.0-450.0)   04/11/18  06:00    


 


MPV  9.4 fl (7.0-11.0)   04/11/18  06:00    


 


Gran %  47.2 % (50.0-68.0)  L  04/11/18  06:00    


 


Lymph % (Auto)  39.5 % (22.0-35.0)  H  04/11/18  06:00    


 


Mono % (Auto)  5.8 % (1.0-6.0)   04/11/18  06:00    


 


Eos % (Auto)  7.0 % (1.5-5.0)  H  04/11/18  06:00    


 


Baso % (Auto)  0.5 % (0.0-3.0)   04/11/18  06:00    


 


Gran #  3.52  (1.4-6.5)   04/11/18  06:00    


 


Lymph # (Auto)  2.9  (1.2-3.4)   04/11/18  06:00    


 


Mono # (Auto)  0.4  (0.1-0.6)   04/11/18  06:00    


 


Eos # (Auto)  0.5  (0.0-0.7)   04/11/18  06:00    


 


Baso # (Auto)  0.04 K/mm3 (0.0-2.0)   04/11/18  06:00    


 


Sodium  143 mmol/L (132-148)   04/11/18  06:00    


 


Potassium  3.6 mmol/L (3.6-5.0)   04/11/18  06:00    


 


Chloride  111 mmol/L ()  H  04/11/18  06:00    


 


Carbon Dioxide  23 mmol/L (21-33)   04/11/18  06:00    


 


Anion Gap  12  (10-20)   04/11/18  06:00    


 


BUN  12 mg/dL (7-21)   04/11/18  06:00    


 


Creatinine  1.0 mg/dl (0.7-1.2)   04/11/18  06:00    


 


Est GFR ( Amer)  > 60   04/11/18  06:00    


 


Est GFR (Non-Af Amer)  56   04/11/18  06:00    


 


Random Glucose  87 mg/dL ()   04/11/18  06:00    


 


Hemoglobin A1c  5.7 % (4.2-6.5)   04/10/18  19:45    


 


Calcium  9.3 mg/dL (8.4-10.5)   04/11/18  06:00    


 


Phosphorus  3.4 mg/dL (2.5-4.5)   04/11/18  06:00    


 


Magnesium  2.2 mg/dL (1.7-2.2)   04/11/18  06:00    


 


Total Bilirubin  0.3 mg/dL (0.2-1.3)   04/11/18  06:00    


 


AST  21 U/L (14-36)   04/11/18  06:00    


 


ALT  25 U/L (7-56)   04/11/18  06:00    


 


Alkaline Phosphatase  86 U/L ()   04/11/18  06:00    


 


Total Creatine Kinase  48 U/L ()   04/10/18  19:45    


 


Troponin I  < 0.01 ng/mL  04/11/18  06:00    


 


Total Protein  6.1 g/dL (5.8-8.3)   04/11/18  06:00    


 


Albumin  3.3 g/dL (3.0-4.8)   04/11/18  06:00    


 


Globulin  2.8 gm/dL  04/11/18  06:00    


 


Albumin/Globulin Ratio  1.2  (1.1-1.8)   04/11/18  06:00    


 


Triglycerides  97 mg/dL ()   04/10/18  19:45    


 


Cholesterol  191 mg/dL (130-200)   04/10/18  19:45    


 


LDL Cholesterol Direct  101 mg/dL (0-129)   04/10/18  19:45    


 


HDL Cholesterol  53 mg/dL (29-60)   04/10/18  19:45    


 


TSH 3rd Generation  2.07 mIU/mL (0.46-4.68)   04/10/18  19:45    














Attending/Attestation





- Attestation


I have personally seen and examined this patient.: Yes


I have fully participated in the care of the patient.: Yes


I have reviewed all pertinent clinical information, including history, physical 

exam and plan: Yes


Notes (Text): 





04/12/18


63 year old female with past medical history of seizure and paroxysmal afib who 

presented with complaint of chest pain.


Serial cardiac enzymes were negative and ACS was ruled out.  


She was seen by cardiology who recommended outpatient stress test.


Patient is discharged home to follow up with her pmd.


Recommend outpatient stress test.





Bert Leon MD


Hospitalist.